# Patient Record
Sex: FEMALE | Race: WHITE | NOT HISPANIC OR LATINO | Employment: FULL TIME | URBAN - METROPOLITAN AREA
[De-identification: names, ages, dates, MRNs, and addresses within clinical notes are randomized per-mention and may not be internally consistent; named-entity substitution may affect disease eponyms.]

---

## 2022-06-27 ENCOUNTER — HOSPITAL ENCOUNTER (EMERGENCY)
Facility: MEDICAL CENTER | Age: 52
End: 2022-06-27
Attending: EMERGENCY MEDICINE
Payer: COMMERCIAL

## 2022-06-27 ENCOUNTER — OFFICE VISIT (OUTPATIENT)
Dept: URGENT CARE | Facility: PHYSICIAN GROUP | Age: 52
End: 2022-06-27
Payer: COMMERCIAL

## 2022-06-27 VITALS
SYSTOLIC BLOOD PRESSURE: 214 MMHG | BODY MASS INDEX: 32.98 KG/M2 | WEIGHT: 168 LBS | HEART RATE: 74 BPM | HEIGHT: 60 IN | RESPIRATION RATE: 16 BRPM | TEMPERATURE: 97.9 F | DIASTOLIC BLOOD PRESSURE: 110 MMHG | OXYGEN SATURATION: 97 %

## 2022-06-27 VITALS
TEMPERATURE: 98.4 F | BODY MASS INDEX: 33.41 KG/M2 | SYSTOLIC BLOOD PRESSURE: 154 MMHG | HEART RATE: 76 BPM | WEIGHT: 170.19 LBS | DIASTOLIC BLOOD PRESSURE: 75 MMHG | HEIGHT: 60 IN | OXYGEN SATURATION: 96 % | RESPIRATION RATE: 16 BRPM

## 2022-06-27 DIAGNOSIS — R10.31 GROIN PAIN, RIGHT: ICD-10-CM

## 2022-06-27 DIAGNOSIS — S76.211A INGUINAL STRAIN, RIGHT, INITIAL ENCOUNTER: ICD-10-CM

## 2022-06-27 DIAGNOSIS — I10 HYPERTENSION, UNSPECIFIED TYPE: ICD-10-CM

## 2022-06-27 DIAGNOSIS — I16.0 HYPERTENSIVE URGENCY: ICD-10-CM

## 2022-06-27 DIAGNOSIS — R03.0 ELEVATED BLOOD PRESSURE READING: ICD-10-CM

## 2022-06-27 LAB
ANION GAP SERPL CALC-SCNC: 13 MMOL/L (ref 7–16)
BASOPHILS # BLD AUTO: 0.6 % (ref 0–1.8)
BASOPHILS # BLD: 0.05 K/UL (ref 0–0.12)
BUN SERPL-MCNC: 18 MG/DL (ref 8–22)
CALCIUM SERPL-MCNC: 9.4 MG/DL (ref 8.5–10.5)
CHLORIDE SERPL-SCNC: 105 MMOL/L (ref 96–112)
CO2 SERPL-SCNC: 21 MMOL/L (ref 20–33)
CREAT SERPL-MCNC: 0.89 MG/DL (ref 0.5–1.4)
EKG IMPRESSION: NORMAL
EOSINOPHIL # BLD AUTO: 0.1 K/UL (ref 0–0.51)
EOSINOPHIL NFR BLD: 1.1 % (ref 0–6.9)
ERYTHROCYTE [DISTWIDTH] IN BLOOD BY AUTOMATED COUNT: 45.8 FL (ref 35.9–50)
GFR SERPLBLD CREATININE-BSD FMLA CKD-EPI: 77 ML/MIN/1.73 M 2
GLUCOSE SERPL-MCNC: 95 MG/DL (ref 65–99)
HCT VFR BLD AUTO: 39.4 % (ref 37–47)
HGB BLD-MCNC: 13.5 G/DL (ref 12–16)
IMM GRANULOCYTES # BLD AUTO: 0.03 K/UL (ref 0–0.11)
IMM GRANULOCYTES NFR BLD AUTO: 0.3 % (ref 0–0.9)
LYMPHOCYTES # BLD AUTO: 1.61 K/UL (ref 1–4.8)
LYMPHOCYTES NFR BLD: 18.3 % (ref 22–41)
MCH RBC QN AUTO: 33.9 PG (ref 27–33)
MCHC RBC AUTO-ENTMCNC: 34.3 G/DL (ref 33.6–35)
MCV RBC AUTO: 99 FL (ref 81.4–97.8)
MONOCYTES # BLD AUTO: 0.69 K/UL (ref 0–0.85)
MONOCYTES NFR BLD AUTO: 7.8 % (ref 0–13.4)
NEUTROPHILS # BLD AUTO: 6.33 K/UL (ref 2–7.15)
NEUTROPHILS NFR BLD: 71.9 % (ref 44–72)
NRBC # BLD AUTO: 0 K/UL
NRBC BLD-RTO: 0 /100 WBC
PLATELET # BLD AUTO: 283 K/UL (ref 164–446)
PMV BLD AUTO: 8.6 FL (ref 9–12.9)
POTASSIUM SERPL-SCNC: 4.5 MMOL/L (ref 3.6–5.5)
RBC # BLD AUTO: 3.98 M/UL (ref 4.2–5.4)
SODIUM SERPL-SCNC: 139 MMOL/L (ref 135–145)
WBC # BLD AUTO: 8.8 K/UL (ref 4.8–10.8)

## 2022-06-27 PROCEDURE — 700102 HCHG RX REV CODE 250 W/ 637 OVERRIDE(OP): Performed by: EMERGENCY MEDICINE

## 2022-06-27 PROCEDURE — 85025 COMPLETE CBC W/AUTO DIFF WBC: CPT

## 2022-06-27 PROCEDURE — 99205 OFFICE O/P NEW HI 60 MIN: CPT | Performed by: PHYSICIAN ASSISTANT

## 2022-06-27 PROCEDURE — 99283 EMERGENCY DEPT VISIT LOW MDM: CPT

## 2022-06-27 PROCEDURE — 93005 ELECTROCARDIOGRAM TRACING: CPT | Performed by: EMERGENCY MEDICINE

## 2022-06-27 PROCEDURE — 80048 BASIC METABOLIC PNL TOTAL CA: CPT

## 2022-06-27 PROCEDURE — 36415 COLL VENOUS BLD VENIPUNCTURE: CPT

## 2022-06-27 PROCEDURE — A9270 NON-COVERED ITEM OR SERVICE: HCPCS | Performed by: EMERGENCY MEDICINE

## 2022-06-27 RX ORDER — LISINOPRIL 10 MG/1
5 TABLET ORAL ONCE
Status: COMPLETED | OUTPATIENT
Start: 2022-06-27 | End: 2022-06-27

## 2022-06-27 RX ORDER — IBUPROFEN 800 MG/1
800 TABLET ORAL EVERY 8 HOURS PRN
Qty: 30 TABLET | Refills: 0 | Status: SHIPPED | OUTPATIENT
Start: 2022-06-27 | End: 2022-12-27 | Stop reason: SDUPTHER

## 2022-06-27 RX ORDER — ESCITALOPRAM OXALATE 10 MG/1
10 TABLET ORAL DAILY
COMMUNITY
End: 2022-12-27 | Stop reason: SDUPTHER

## 2022-06-27 RX ORDER — AMLODIPINE BESYLATE 5 MG/1
5 TABLET ORAL
Status: DISCONTINUED | OUTPATIENT
Start: 2022-06-27 | End: 2022-06-27 | Stop reason: HOSPADM

## 2022-06-27 RX ORDER — AMLODIPINE BESYLATE AND BENAZEPRIL HYDROCHLORIDE 5; 10 MG/1; MG/1
1 CAPSULE ORAL DAILY
Qty: 30 CAPSULE | Refills: 0 | Status: SHIPPED | OUTPATIENT
Start: 2022-06-27 | End: 2022-07-26

## 2022-06-27 RX ADMIN — AMLODIPINE BESYLATE 5 MG: 5 TABLET ORAL at 17:11

## 2022-06-27 RX ADMIN — LISINOPRIL 5 MG: 10 TABLET ORAL at 17:11

## 2022-06-27 NOTE — ED TRIAGE NOTES
.  Chief Complaint   Patient presents with   • Groin Pain     X 3 weeks, patient feels as if she pulled a muscle in her groin. Now she is having difficulty walking.    • Sent by MD     Patient went to  for the groin pain, but was sent here due to HTN. Denies chest pain, denies headache, endorses dizziness.        53 yo female wheeled to triage for above complaint. Patient able to ambulate with limp in triage room. Right groin pain. Patient has not seen a PCP in a while due to insurance issues, but has been hypertensive on her last visits to the ER.      Pt is alert and oriented, speaking in full sentences, follows commands and responds appropriately to questions. NAD.      Patient placed back in lobby and educated on triage process. Asked to inform RN of any changes.    BP (!) 197/122   Pulse 72   Temp 36.8 °C (98.2 °F)   Resp 14   Ht 1.524 m (5')   Wt 77.2 kg (170 lb 3.1 oz)   SpO2 98%   BMI 33.24 kg/m²

## 2022-06-27 NOTE — ED PROVIDER NOTES
ED Provider Note    Scribed for Catracho Rivera M.D. by Joey Crowder. 6/27/2022, 4:40 PM.    Primary care provider: Pcp Pt States None  Means of arrival: Walk in  History obtained from: Patient  History limited by: None    CHIEF COMPLAINT  Chief Complaint   Patient presents with    Groin Pain     X 3 weeks, patient feels as if she pulled a muscle in her groin. Now she is having difficulty walking.     Sent by MD     Patient went to  for the groin pain, but was sent here due to HTN. Denies chest pain, denies headache, endorses dizziness.        HPI  Gabriela Carrillo is a 52 y.o. female who presents to the Emergency Department for evaluation of groin pain onset 3 weeks ago. Patient states she has was picking up something and felt like something strained. Patient states she has been taking Tylenol and Aleve with minimal alleviation to symptoms. She notes she presented to urgent care today for evaluation but was sent to the ER because her blood pressure was over 200. She presented to the ER about 2 weeks ago with regards to bronchitis and had high blood pressure at that time as well (209). Patient reports associated dizziness and shortness of breath, but denies any chest pain, shortness of breath, headache, or vision changes. She states she has been feeling shortness of breath on exertion over the past year. Patient states she takes medications for menopause.       REVIEW OF SYSTEMS  As above otherwise all other systems are negative  C    PAST MEDICAL HISTORY   has a past medical history of Bilateral ovarian cysts, Hypertension, and Murmur, cardiac.    SURGICAL HISTORY  patient denies any surgical history    SOCIAL HISTORY  Social History     Tobacco Use    Smoking status: Never Smoker    Smokeless tobacco: Never Used   Vaping Use    Vaping Use: Never used   Substance Use Topics    Alcohol use: Yes     Alcohol/week: 8.4 oz     Types: 14 Cans of beer per week     Comment: cut down recently to lose weight     Drug use: Not Currently      Social History     Substance and Sexual Activity   Drug Use Not Currently       FAMILY HISTORY  Family History   Problem Relation Age of Onset    Heart Disease Mother     Hypertension Mother     Heart Disease Father     Hypertension Father        CURRENT MEDICATIONS  Home Medications       Reviewed by Margarita Roberts R.N. (Registered Nurse) on 06/27/22 at 1619  Med List Status: Not Addressed     Medication Last Dose Status   escitalopram (LEXAPRO) 10 MG Tab  Active                    ALLERGIES  No Known Allergies    PHYSICAL EXAM  VITAL SIGNS: BP (!) 197/122   Pulse 72   Temp 36.8 °C (98.2 °F)   Resp 14   Ht 1.524 m (5')   Wt 77.2 kg (170 lb 3.1 oz)   SpO2 98%   BMI 33.24 kg/m²     Constitutional: Well developed, Well nourished, No acute distress, Non-toxic appearance.   HENT: Normocephalic, Atraumatic, Bilateral external ears normal, oropharynx moist, No oral exudates, Nose normal.   Eyes:conjunctiva is normal, there are no signs of exudate.   Neck: Supple, no meningeal signs.  Lymphatic: No lymphadenopathy noted.   Cardiovascular: Regular rate and rhythm without murmurs gallops or rubs.   Thorax & Lungs: No respiratory distress. Breathing comfortably. Lungs are clear to auscultation bilaterally, there are no wheezes no rales. Chest wall is nontender.  Abdomen: Soft, nontender, nondistended. Bowel sounds are present.   Skin: Warm, Dry, No erythema,   Back: No tenderness, No CVA tenderness.  Musculoskeletal: Good range of motion in all major joints. No major deformities noted. Intact distal pulses, no clubbing, no cyanosis, no edema. Tenderness to gracilus muscle, tenderness to posterior hamstring insertion into hip.  Neurologic: Alert & oriented x 3, Moving all extremities. No gross abnormalities.    Psychiatric: Affect normal, Judgment normal, Mood normal.     LABS  Results for orders placed or performed during the hospital encounter of 06/27/22   CBC WITH DIFFERENTIAL    Result Value Ref Range    WBC 8.8 4.8 - 10.8 K/uL    RBC 3.98 (L) 4.20 - 5.40 M/uL    Hemoglobin 13.5 12.0 - 16.0 g/dL    Hematocrit 39.4 37.0 - 47.0 %    MCV 99.0 (H) 81.4 - 97.8 fL    MCH 33.9 (H) 27.0 - 33.0 pg    MCHC 34.3 33.6 - 35.0 g/dL    RDW 45.8 35.9 - 50.0 fL    Platelet Count 283 164 - 446 K/uL    MPV 8.6 (L) 9.0 - 12.9 fL    Neutrophils-Polys 71.90 44.00 - 72.00 %    Lymphocytes 18.30 (L) 22.00 - 41.00 %    Monocytes 7.80 0.00 - 13.40 %    Eosinophils 1.10 0.00 - 6.90 %    Basophils 0.60 0.00 - 1.80 %    Immature Granulocytes 0.30 0.00 - 0.90 %    Nucleated RBC 0.00 /100 WBC    Neutrophils (Absolute) 6.33 2.00 - 7.15 K/uL    Lymphs (Absolute) 1.61 1.00 - 4.80 K/uL    Monos (Absolute) 0.69 0.00 - 0.85 K/uL    Eos (Absolute) 0.10 0.00 - 0.51 K/uL    Baso (Absolute) 0.05 0.00 - 0.12 K/uL    Immature Granulocytes (abs) 0.03 0.00 - 0.11 K/uL    NRBC (Absolute) 0.00 K/uL   BMP   Result Value Ref Range    Sodium 139 135 - 145 mmol/L    Potassium 4.5 3.6 - 5.5 mmol/L    Chloride 105 96 - 112 mmol/L    Co2 21 20 - 33 mmol/L    Glucose 95 65 - 99 mg/dL    Bun 18 8 - 22 mg/dL    Creatinine 0.89 0.50 - 1.40 mg/dL    Calcium 9.4 8.5 - 10.5 mg/dL    Anion Gap 13.0 7.0 - 16.0   ESTIMATED GFR   Result Value Ref Range    GFR (CKD-EPI) 77 >60 mL/min/1.73 m 2   EKG   Result Value Ref Range    Report       [facility] Emergency Dept.    Test Date:  2022  Pt Name:    NADIRA ERNANDEZ                Department: ER  MRN:        [id number]                      Room:       [facility]  Gender:     Female                       Technician: [id number]  :        1970                   Requested By:JORGE MCPHERSON  Order #:    [id number]                    Reading MD: JORGE MCPHERSON MD    Measurements  Intervals                                Axis  Rate:       68                           P:          10  AK:         124                          QRS:        19  QRSD:       80                           T:           16  QT:         412  QTc:        439    Interpretive Statements  SINUS RHYTHM  No previous ECG available for comparison  Normal ECG  Electronically Signed On 6- 17:11:20 PDT by JORGE MCPHERSON MD        All labs reviewed by me.    EKG  Interpreted by me    COURSE & MEDICAL DECISION MAKING  Pertinent Labs & Imaging studies reviewed. (See chart for details)    4:40 PM - Patient seen and examined at bedside. She will be treated with amlodipine 5 mg and lisinopril 5 mg. Ordered EKG, CBC with diff, and BMP to evaluate her symptoms.   Instructed patient to follow up with primary care to address her hypertension. Recommended she begin physical therapy at home and follow up with ortho if needed.    Decision Making:  Patient presents for evaluation.  Clinically the patient does have a strain of her gracilis and as well as the hamstring insertion of the right leg.  Secondary cause though she does have hypertension.  Basic metabolic panel shows no signs of renal insufficiency or other significant signs of endorgan injury.  I will start the patient on blood pressure medications and I recommended for the patient to follow-up with her primary care physician for further outpatient evaluation and care and to obtain a blood pressure cuff.  Patient should be taking her blood pressure 2 times a day randomly in the morning and in the evening and then follow-up with her primary care physician with the data.  If she has continued pain in her groin region recommend follow-up with orthopedics for physical therapy referral return as needed.  I will start her with ibuprofen I have given her range of motion and physical therapy instructions here and she is to follow-up as above.    It was noticed that the patient's blood pressure was greater than 120/80 on today's visit. At this point, most likely related to reactive hypertension, secondary to the emergency visit itself. I have recommend the patient followup with her primary care  physician for recheck of her blood pressure.        The patient will return for new or worsening symptoms and is stable at the time of discharge.    The patient is referred to a primary physician for blood pressure management, diabetic screening, and for all other preventative health concerns.    DISPOSITION:  Patient will be discharged home in stable condition.    FOLLOW UP:  [facility] [place]  [address] [address]  [phone]          OUTPATIENT MEDICATIONS:  New Prescriptions    AMLODIPINE-BENAZEPRIL (LOTREL) 5-10 MG PER CAPSULE    Take 1 Capsule by mouth every day.    IBUPROFEN (MOTRIN) 800 MG TAB    Take 1 Tablet by mouth every 8 hours as needed for Moderate Pain.           FINAL IMPRESSION  1. Hypertension, unspecified type    2. Inguinal strain, right, initial encounter          IJoey (Scribe), am scribing for, and in the presence of, Catracho Rivera M.D..    Electronically signed by: Joey Crowder (Scribe), 6/27/2022    ICatracho M.D. personally performed the services described in this documentation, as scribed by Joey Crowder in my presence, and it is both accurate and complete.    The note accurately reflects work and decisions made by me.  Catracho Rivera M.D.  6/27/2022  6:55 PM

## 2022-06-27 NOTE — PROGRESS NOTES
Subjective:   Gabriela Carrillo is a 52 y.o. female who presents for Groin Injury (X 4 days, just started hurting suddenly after bending down)      HPI  Patient is a 52-year-old female who presents to clinic with complaints of right groin pain.  She reports of right groin pain for about 3 weeks, however this was resolving until several days ago she bent over picking up her dog toys and felt immediate pain to her right groin.  She felt it strain and is worse with walking.  Location to the right upper inner thigh/groin.  She went to [facility] this morning and she took her blood pressure and noticed that her blood pressure was 179/114.  Denies any other symptoms.  Denies any headache, dizziness, chest pain, shortness of breath.  She has no other complaints or concerns.          Medications:    • escitalopram Tabs    Allergies: Patient has no known allergies.    Problem List: Gabriela Carrillo does not have a problem list on file.    Surgical History:  No past surgical history on file.    Past Social Hx: Gabriela Carrillo  reports that she has never smoked. She has never used smokeless tobacco. She reports current alcohol use. She reports previous drug use.     Past Family Hx:  Gabriela Carrillo family history includes Heart Disease in her father and mother; Hypertension in her father and mother.     Problem list, medications, and allergies reviewed by myself today in Epic.     Objective:     BP (!) 218/122 (BP Location: Right arm, Patient Position: Sitting, BP Cuff Size: Adult)   Pulse 74   Temp 36.6 °C (97.9 °F) (Temporal)   Resp 16   Ht 1.524 m (5')   Wt 76.2 kg (168 lb)   SpO2 97%   BMI 32.81 kg/m²     Physical Exam  Vitals reviewed.   Constitutional:       General: She is not in acute distress.     Appearance: Normal appearance. She is not ill-appearing or toxic-appearing.   Eyes:      Conjunctiva/sclera: Conjunctivae normal.      Pupils: Pupils are equal, round, and reactive to light.   Cardiovascular:      Rate and Rhythm:  Normal rate and regular rhythm.      Heart sounds: Normal heart sounds.   Pulmonary:      Effort: Pulmonary effort is normal. No respiratory distress.      Breath sounds: Normal breath sounds. No wheezing, rhonchi or rales.   Musculoskeletal:      Cervical back: Neck supple.   Skin:     General: Skin is warm and dry.   Neurological:      General: No focal deficit present.      Mental Status: She is alert and oriented to person, place, and time.   Psychiatric:         Mood and Affect: Mood normal.         Behavior: Behavior normal.         Diagnosis and associated orders:     1. Elevated blood pressure reading    2. Hypertensive urgency    3. Groin pain, right    Other orders  - escitalopram (LEXAPRO) 10 MG Tab; Take 10 mg by mouth every day.       Comments/MDM:     • Patient's initial /122.  After 15 minutes, repeat /110.  Discussed with patient her going pain most likely a strain, however this was not further evaluated as I am concerned about her blood pressure.  Discussed with patient differential diagnosis and concern for possible secondary organ complications due to her elevated blood pressure.  Discussed this needs to be managed emergently.  Therefore, it was highly recommended she present immediately to the ER for higher level of evaluation and care.  Call transfer center and report given awaiting patient arrival.  Patient verbalized understanding and agreed to plan of care.  She had no other questions or concerns.  She is going to present via private vehicle.       New acute problem with uncertain prognosis that poses a threat to bodily function which will require further higher level evaluation and treatment in the emergency room or possible hospitalization.    Please note that this dictation was created using voice recognition software. I have made a reasonable attempt to correct obvious errors, but I expect that there are errors of grammar and possibly content that I did not discover before  finalizing the note.    This note was electronically signed by Umberto Lazaro PA-C

## 2022-06-27 NOTE — Clinical Note
Gabriela Carrillo was seen and treated in our emergency department on 6/27/2022.  She may return to work on 07/04/2022.  Please allow for light duty, no prolonged standing due to an injury for 1 week     If you have any questions or concerns, please don't hesitate to call.      Catracho Rivera M.D.

## 2022-06-28 NOTE — ED NOTES
Patient discharged home per ERP.  Discharge teaching and education discussed with patient. POC discussed.   Patient verbalized understanding of discharge teaching and education. No other questions at this time.     RX x 1 sent to pharmacy by ERP. Work note provided.     VSS. Patient alert and oriented. Patient arranged ride for self. Able to ambulate off unit safely with steady gait.

## 2022-07-26 ENCOUNTER — OFFICE VISIT (OUTPATIENT)
Dept: URGENT CARE | Facility: PHYSICIAN GROUP | Age: 52
End: 2022-07-26
Payer: COMMERCIAL

## 2022-07-26 ENCOUNTER — APPOINTMENT (OUTPATIENT)
Dept: RADIOLOGY | Facility: IMAGING CENTER | Age: 52
End: 2022-07-26
Attending: PHYSICIAN ASSISTANT
Payer: COMMERCIAL

## 2022-07-26 VITALS
RESPIRATION RATE: 20 BRPM | TEMPERATURE: 98 F | HEART RATE: 67 BPM | HEIGHT: 60 IN | BODY MASS INDEX: 31.41 KG/M2 | OXYGEN SATURATION: 94 % | DIASTOLIC BLOOD PRESSURE: 76 MMHG | WEIGHT: 160 LBS | SYSTOLIC BLOOD PRESSURE: 142 MMHG

## 2022-07-26 DIAGNOSIS — R05.2 SUBACUTE COUGH: ICD-10-CM

## 2022-07-26 DIAGNOSIS — R05.9 COUGH: ICD-10-CM

## 2022-07-26 DIAGNOSIS — U07.1 COVID-19: ICD-10-CM

## 2022-07-26 DIAGNOSIS — I10 HYPERTENSION, UNSPECIFIED TYPE: ICD-10-CM

## 2022-07-26 DIAGNOSIS — B34.9 NONSPECIFIC SYNDROME SUGGESTIVE OF VIRAL ILLNESS: ICD-10-CM

## 2022-07-26 LAB
EXTERNAL QUALITY CONTROL: ABNORMAL
SARS-COV+SARS-COV-2 AG RESP QL IA.RAPID: POSITIVE

## 2022-07-26 PROCEDURE — 99214 OFFICE O/P EST MOD 30 MIN: CPT | Mod: CS | Performed by: PHYSICIAN ASSISTANT

## 2022-07-26 PROCEDURE — 71046 X-RAY EXAM CHEST 2 VIEWS: CPT | Mod: TC | Performed by: PHYSICIAN ASSISTANT

## 2022-07-26 PROCEDURE — 87426 SARSCOV CORONAVIRUS AG IA: CPT | Performed by: PHYSICIAN ASSISTANT

## 2022-07-26 RX ORDER — AMLODIPINE BESYLATE AND BENAZEPRIL HYDROCHLORIDE 5; 10 MG/1; MG/1
1 CAPSULE ORAL DAILY
Qty: 14 CAPSULE | Refills: 0 | Status: SHIPPED | OUTPATIENT
Start: 2022-07-26 | End: 2022-08-12 | Stop reason: SDUPTHER

## 2022-07-26 RX ORDER — DEXTROMETHORPHAN HYDROBROMIDE AND PROMETHAZINE HYDROCHLORIDE 15; 6.25 MG/5ML; MG/5ML
5 SYRUP ORAL EVERY 4 HOURS PRN
Qty: 120 ML | Refills: 0 | Status: SHIPPED | OUTPATIENT
Start: 2022-07-26 | End: 2022-08-26

## 2022-07-26 RX ORDER — BENZONATATE 200 MG/1
200 CAPSULE ORAL 3 TIMES DAILY PRN
Qty: 60 CAPSULE | Refills: 0 | Status: SHIPPED | OUTPATIENT
Start: 2022-07-26 | End: 2022-08-26

## 2022-07-26 ASSESSMENT — ENCOUNTER SYMPTOMS
SORE THROAT: 0
COUGH: 1
FEVER: 0
CHILLS: 0
SHORTNESS OF BREATH: 0
NAUSEA: 0
VOMITING: 0
SPUTUM PRODUCTION: 1

## 2022-07-26 NOTE — PROGRESS NOTES
Subjective:   Gabriela Carrillo is a 52 y.o. female who presents for Cough (Headache,x1 month/Refill amlodipine-benazepril)        1.  Patient presents with concerns of elevated blood pressure that has been present for the last several months.  She was previously evaluated for this in the ED and started on amlodipine and benazepril.  This has been working well to control her blood pressure but she states that she developed a dry cough almost immediately after starting this medication.  Additionally she also gets occasional headaches from it.  She has a follow-up appointment with her new PCP on 8/3/2022.  No chest pain, lightheadedness, dizziness, syncopal episodes.      2. patient states that she has been experiencing worsening cough, malaise, fatigue for the last 8 days or so.  She had a subjective fever last night and coughed up a bit of blood-tinged mucus this morning.  No chest pain, shortness of breath, difficulty breathing.  She does endorse body aches.  She is not taking any medications for symptoms.  Her  tested positive for COVID-19 last week, but she states that she has not seen him for couple weeks as he is on the road most of the time.  Testing for COVID-19 last week was negative.      Review of Systems   Constitutional: Negative for chills and fever.   HENT: Positive for congestion. Negative for sore throat.    Respiratory: Positive for cough and sputum production. Negative for shortness of breath.    Cardiovascular: Negative for chest pain.   Gastrointestinal: Negative for nausea and vomiting.       PMH:  has a past medical history of Bilateral ovarian cysts, Hypertension, and Murmur, cardiac.  MEDS:   Current Outpatient Medications:   •  amlodipine-benazepril (LOTREL) 5-10 MG per capsule, Take 1 Capsule by mouth every day., Disp: 14 Capsule, Rfl: 0  •  promethazine-dextromethorphan (PROMETHAZINE-DM) 6.25-15 MG/5ML syrup, Take 5 mL by mouth every four hours as needed for Cough., Disp: 120  mL, Rfl: 0  •  benzonatate (TESSALON) 200 MG capsule, Take 1 Capsule by mouth 3 times a day as needed for Cough., Disp: 60 Capsule, Rfl: 0  •  escitalopram (LEXAPRO) 10 MG Tab, Take 10 mg by mouth every day., Disp: , Rfl:   •  ibuprofen (MOTRIN) 800 MG Tab, Take 1 Tablet by mouth every 8 hours as needed for Moderate Pain., Disp: 30 Tablet, Rfl: 0  •  ibuprofen (MOTRIN) 800 MG Tab, Take 1 Tablet by mouth 2 times a day with meals for 30 days. (Patient not taking: Reported on 7/26/2022), Disp: 60 Tablet, Rfl: 3  ALLERGIES: No Known Allergies  SURGHX: No past surgical history on file.  SOCHX:  reports that she has never smoked. She has never used smokeless tobacco. She reports current alcohol use of about 8.4 oz of alcohol per week. She reports previous drug use.  FH: Family history was reviewed, no pertinent findings to report   Objective:   BP (!) 142/76 (BP Location: Left arm, Patient Position: Sitting, BP Cuff Size: Adult)   Pulse 67   Temp 36.7 °C (98 °F) (Temporal)   Resp 20   Ht 1.524 m (5')   Wt 72.6 kg (160 lb)   SpO2 94%   BMI 31.25 kg/m²   Physical Exam  Vitals reviewed.   Constitutional:       General: She is not in acute distress.     Appearance: Normal appearance. She is well-developed. She is not toxic-appearing.   HENT:      Head: Normocephalic and atraumatic.      Right Ear: External ear normal.      Left Ear: External ear normal.      Nose: Congestion present.   Cardiovascular:      Rate and Rhythm: Normal rate and regular rhythm.      Heart sounds: Normal heart sounds, S1 normal and S2 normal.   Pulmonary:      Effort: Pulmonary effort is normal. No respiratory distress.      Breath sounds: Normal breath sounds. No stridor. No decreased breath sounds, wheezing, rhonchi or rales.      Comments: Occasional harsh cough.  Skin:     General: Skin is dry.   Neurological:      Comments: Alert and oriented.    Psychiatric:         Speech: Speech normal.         Behavior: Behavior normal.              Imaging:  COMPARISON:  None.     FINDINGS:  The heart is normal in size.  No pulmonary infiltrates or consolidations are noted.  No pleural effusions are appreciated.  No visible pneumothorax.     IMPRESSION:     No radiographic evidence of acute cardiopulmonary disease.  Assessment/Plan:   1. COVID-19  - promethazine-dextromethorphan (PROMETHAZINE-DM) 6.25-15 MG/5ML syrup; Take 5 mL by mouth every four hours as needed for Cough.  Dispense: 120 mL; Refill: 0  - benzonatate (TESSALON) 200 MG capsule; Take 1 Capsule by mouth 3 times a day as needed for Cough.  Dispense: 60 Capsule; Refill: 0    2. Nonspecific syndrome suggestive of viral illness  - promethazine-dextromethorphan (PROMETHAZINE-DM) 6.25-15 MG/5ML syrup; Take 5 mL by mouth every four hours as needed for Cough.  Dispense: 120 mL; Refill: 0  - benzonatate (TESSALON) 200 MG capsule; Take 1 Capsule by mouth 3 times a day as needed for Cough.  Dispense: 60 Capsule; Refill: 0    3. Subacute cough  - DX-CHEST-2 VIEWS; Future  - POCT SARS-COV Antigen CLARENCE (Symptomatic only)    4. Hypertension, unspecified type  - amlodipine-benazepril (LOTREL) 5-10 MG per capsule; Take 1 Capsule by mouth every day.  Dispense: 14 Capsule; Refill: 0    1.  Patient positive for COVID-19.  Urgently there is no evidence of COVID-19 pneumonia or other acute cardiopulmonary abnormality on imaging.  It is unclear when symptoms started exactly but I suspect it is well over a week ago.  As such she is not a candidate for antiviral therapy.  Patient has a good understanding of etiology and disease course.  Quarantine in accordance with CDC guidelines.  Rest, hydrate, antitussives as needed.  She may also try nasal saline rinses and Flonase.  Recommend reevaluation with any new or worsening symptoms.    If she develops chest pain, shortness of breath, difficulty breathing, pain with breathing recommend that she go to the emergency room for reevaluation and further management.    2.   Chronic problem.  Has upcoming appointment with new PCP next week.  I suspect that subacute dry cough is related to the ACE inhibitor.  Patient will likely require treatment modification, but I will leave that to her PCP to address.  We will refill her medication in the interim and have her follow-up with PCP as scheduled.    Differential diagnosis, natural history, supportive care, and indications for immediate follow-up discussed.

## 2022-07-26 NOTE — LETTER
July 26, 2022    To Whom It May Concern:         This is confirmation that Gabriela Carrillo attended her scheduled appointment with Shivam Mullen P.A.-C. on 7/26/22.  She was instructed to quarantine in accordance with CDC guidelines.    If you had COVID-19 and had symptoms, isolate for at least 5 days. To calculate your 5-day isolation period, day 0 is your first day of symptoms. Day 1 is the first full day after your symptoms developed. You can leave isolation after 5 full days.    • You can end isolation after 5 full days if you are fever-free for 24 hours without the use of fever-reducing medication and your other symptoms have improved (Loss of taste and smell may persist for weeks or months after recovery and need not delay the end of isolation).     • You should continue to wear a well-fitting mask around others at home and in public for 5 additional days (day 6 through day 10) after the end of your 5-day isolation period. If you are unable to wear a mask when around others, you should continue to isolate for a full 10 days. Avoid people who are immunocompromised or at high risk for severe disease, and nursing homes and other high-risk settings, until after at least 10 days.     • If you continue to have fever or your other symptoms have not improved after 5 days of isolation, you should wait to end your isolation until you are fever-free for 24 hours without the use of fever-reducing medication and your other symptoms have improved. Continue to wear a well-fitting mask. Contact your healthcare provider if you have questions    • Do not travel during your 5-day isolation period. After you end isolation, avoid travel until a full 10 days after your first day of symptoms. If you must travel on days 6-10, wear a well-fitting mask when you are around others for the entire duration of travel. If you are unable to wear a mask, you should not travel during the 10 days.     • Do not go to places where you are  unable to wear a mask, such as restaurants and some gyms, and avoid eating around others at home and at work until a full 10 days after your first day of symptoms.         If you have any questions please do not hesitate to call me at the phone number listed below.    Sincerely,    LUDIN HamiltonC.  [phone]

## 2022-08-01 SDOH — HEALTH STABILITY: PHYSICAL HEALTH: ON AVERAGE, HOW MANY MINUTES DO YOU ENGAGE IN EXERCISE AT THIS LEVEL?: 10 MIN

## 2022-08-01 SDOH — ECONOMIC STABILITY: INCOME INSECURITY: IN THE LAST 12 MONTHS, WAS THERE A TIME WHEN YOU WERE NOT ABLE TO PAY THE MORTGAGE OR RENT ON TIME?: NO

## 2022-08-01 SDOH — ECONOMIC STABILITY: HOUSING INSECURITY
IN THE LAST 12 MONTHS, WAS THERE A TIME WHEN YOU DID NOT HAVE A STEADY PLACE TO SLEEP OR SLEPT IN A SHELTER (INCLUDING NOW)?: NO

## 2022-08-01 SDOH — ECONOMIC STABILITY: TRANSPORTATION INSECURITY
IN THE PAST 12 MONTHS, HAS THE LACK OF TRANSPORTATION KEPT YOU FROM MEDICAL APPOINTMENTS OR FROM GETTING MEDICATIONS?: NO

## 2022-08-01 SDOH — ECONOMIC STABILITY: FOOD INSECURITY: WITHIN THE PAST 12 MONTHS, THE FOOD YOU BOUGHT JUST DIDN'T LAST AND YOU DIDN'T HAVE MONEY TO GET MORE.: NEVER TRUE

## 2022-08-01 SDOH — ECONOMIC STABILITY: FOOD INSECURITY: WITHIN THE PAST 12 MONTHS, YOU WORRIED THAT YOUR FOOD WOULD RUN OUT BEFORE YOU GOT MONEY TO BUY MORE.: NEVER TRUE

## 2022-08-01 SDOH — ECONOMIC STABILITY: HOUSING INSECURITY: IN THE LAST 12 MONTHS, HOW MANY PLACES HAVE YOU LIVED?: 1

## 2022-08-01 SDOH — HEALTH STABILITY: MENTAL HEALTH
STRESS IS WHEN SOMEONE FEELS TENSE, NERVOUS, ANXIOUS, OR CAN'T SLEEP AT NIGHT BECAUSE THEIR MIND IS TROUBLED. HOW STRESSED ARE YOU?: ONLY A LITTLE

## 2022-08-01 SDOH — HEALTH STABILITY: PHYSICAL HEALTH: ON AVERAGE, HOW MANY DAYS PER WEEK DO YOU ENGAGE IN MODERATE TO STRENUOUS EXERCISE (LIKE A BRISK WALK)?: 0 DAYS

## 2022-08-01 SDOH — ECONOMIC STABILITY: INCOME INSECURITY: HOW HARD IS IT FOR YOU TO PAY FOR THE VERY BASICS LIKE FOOD, HOUSING, MEDICAL CARE, AND HEATING?: NOT HARD AT ALL

## 2022-08-01 SDOH — ECONOMIC STABILITY: TRANSPORTATION INSECURITY
IN THE PAST 12 MONTHS, HAS LACK OF TRANSPORTATION KEPT YOU FROM MEETINGS, WORK, OR FROM GETTING THINGS NEEDED FOR DAILY LIVING?: NO

## 2022-08-01 SDOH — ECONOMIC STABILITY: TRANSPORTATION INSECURITY
IN THE PAST 12 MONTHS, HAS LACK OF RELIABLE TRANSPORTATION KEPT YOU FROM MEDICAL APPOINTMENTS, MEETINGS, WORK OR FROM GETTING THINGS NEEDED FOR DAILY LIVING?: NO

## 2022-08-01 ASSESSMENT — SOCIAL DETERMINANTS OF HEALTH (SDOH)
HOW MANY DRINKS CONTAINING ALCOHOL DO YOU HAVE ON A TYPICAL DAY WHEN YOU ARE DRINKING: 1 OR 2
HOW OFTEN DO YOU GET TOGETHER WITH FRIENDS OR RELATIVES?: ONCE A WEEK
DO YOU BELONG TO ANY CLUBS OR ORGANIZATIONS SUCH AS CHURCH GROUPS UNIONS, FRATERNAL OR ATHLETIC GROUPS, OR SCHOOL GROUPS?: NO
HOW OFTEN DO YOU ATTENT MEETINGS OF THE CLUB OR ORGANIZATION YOU BELONG TO?: NEVER
HOW HARD IS IT FOR YOU TO PAY FOR THE VERY BASICS LIKE FOOD, HOUSING, MEDICAL CARE, AND HEATING?: NOT HARD AT ALL
HOW OFTEN DO YOU HAVE SIX OR MORE DRINKS ON ONE OCCASION: LESS THAN MONTHLY
IN A TYPICAL WEEK, HOW MANY TIMES DO YOU TALK ON THE PHONE WITH FAMILY, FRIENDS, OR NEIGHBORS?: TWICE A WEEK
IN A TYPICAL WEEK, HOW MANY TIMES DO YOU TALK ON THE PHONE WITH FAMILY, FRIENDS, OR NEIGHBORS?: TWICE A WEEK
HOW OFTEN DO YOU GET TOGETHER WITH FRIENDS OR RELATIVES?: ONCE A WEEK
HOW OFTEN DO YOU GET TOGETHER WITH FRIENDS OR RELATIVES?: ONCE A WEEK
HOW OFTEN DO YOU ATTEND CHURCH OR RELIGIOUS SERVICES?: NEVER
HOW OFTEN DO YOU ATTEND CHURCH OR RELIGIOUS SERVICES?: NEVER
HOW OFTEN DO YOU ATTENT MEETINGS OF THE CLUB OR ORGANIZATION YOU BELONG TO?: NEVER
DO YOU BELONG TO ANY CLUBS OR ORGANIZATIONS SUCH AS CHURCH GROUPS UNIONS, FRATERNAL OR ATHLETIC GROUPS, OR SCHOOL GROUPS?: NO
HOW OFTEN DO YOU ATTENT MEETINGS OF THE CLUB OR ORGANIZATION YOU BELONG TO?: NEVER
HOW OFTEN DO YOU HAVE A DRINK CONTAINING ALCOHOL: MONTHLY OR LESS
DO YOU BELONG TO ANY CLUBS OR ORGANIZATIONS SUCH AS CHURCH GROUPS UNIONS, FRATERNAL OR ATHLETIC GROUPS, OR SCHOOL GROUPS?: NO
IN A TYPICAL WEEK, HOW MANY TIMES DO YOU TALK ON THE PHONE WITH FAMILY, FRIENDS, OR NEIGHBORS?: TWICE A WEEK
HOW OFTEN DO YOU ATTEND CHURCH OR RELIGIOUS SERVICES?: NEVER
WITHIN THE PAST 12 MONTHS, YOU WORRIED THAT YOUR FOOD WOULD RUN OUT BEFORE YOU GOT THE MONEY TO BUY MORE: NEVER TRUE

## 2022-08-01 ASSESSMENT — LIFESTYLE VARIABLES
HOW OFTEN DO YOU HAVE SIX OR MORE DRINKS ON ONE OCCASION: LESS THAN MONTHLY
AUDIT-C TOTAL SCORE: 2
SKIP TO QUESTIONS 9-10: 0
HOW MANY STANDARD DRINKS CONTAINING ALCOHOL DO YOU HAVE ON A TYPICAL DAY: 1 OR 2
AUDIT-C TOTAL SCORE: 2
HOW OFTEN DO YOU HAVE A DRINK CONTAINING ALCOHOL: MONTHLY OR LESS
SKIP TO QUESTIONS 9-10: 0
HOW MANY STANDARD DRINKS CONTAINING ALCOHOL DO YOU HAVE ON A TYPICAL DAY: 1 OR 2
HOW OFTEN DO YOU HAVE SIX OR MORE DRINKS ON ONE OCCASION: LESS THAN MONTHLY
HOW OFTEN DO YOU HAVE A DRINK CONTAINING ALCOHOL: MONTHLY OR LESS

## 2022-08-03 ENCOUNTER — APPOINTMENT (OUTPATIENT)
Dept: MEDICAL GROUP | Facility: PHYSICIAN GROUP | Age: 52
End: 2022-08-03
Attending: EMERGENCY MEDICINE
Payer: COMMERCIAL

## 2022-08-12 DIAGNOSIS — I10 HYPERTENSION, UNSPECIFIED TYPE: ICD-10-CM

## 2022-08-12 RX ORDER — AMLODIPINE BESYLATE AND BENAZEPRIL HYDROCHLORIDE 5; 10 MG/1; MG/1
1 CAPSULE ORAL DAILY
Qty: 14 CAPSULE | Refills: 0 | Status: SHIPPED | OUTPATIENT
Start: 2022-08-12 | End: 2022-08-26

## 2022-08-12 NOTE — TELEPHONE ENCOUNTER
VOICEMAIL  1. Caller Name: Gabriela Carrillo                    Call Back Number: [phone] (home)     2. Message: Pt called, she is set to establish on 08/26/22 with Dhaval Donovan, does not have PCP at this time, and will be out of her HTN medication as of tomorrow. Pt is asking if Dhaval Donovan would be willing to send in a refill to allow her to continue this medication until she is established with her.  Please advise.  Pt has asked for a cb.

## 2022-08-25 SDOH — HEALTH STABILITY: PHYSICAL HEALTH: ON AVERAGE, HOW MANY MINUTES DO YOU ENGAGE IN EXERCISE AT THIS LEVEL?: 10 MIN

## 2022-08-25 SDOH — ECONOMIC STABILITY: FOOD INSECURITY: WITHIN THE PAST 12 MONTHS, YOU WORRIED THAT YOUR FOOD WOULD RUN OUT BEFORE YOU GOT MONEY TO BUY MORE.: NEVER TRUE

## 2022-08-25 SDOH — ECONOMIC STABILITY: FOOD INSECURITY: WITHIN THE PAST 12 MONTHS, THE FOOD YOU BOUGHT JUST DIDN'T LAST AND YOU DIDN'T HAVE MONEY TO GET MORE.: NEVER TRUE

## 2022-08-25 SDOH — ECONOMIC STABILITY: INCOME INSECURITY: HOW HARD IS IT FOR YOU TO PAY FOR THE VERY BASICS LIKE FOOD, HOUSING, MEDICAL CARE, AND HEATING?: NOT HARD AT ALL

## 2022-08-25 SDOH — ECONOMIC STABILITY: INCOME INSECURITY: IN THE LAST 12 MONTHS, WAS THERE A TIME WHEN YOU WERE NOT ABLE TO PAY THE MORTGAGE OR RENT ON TIME?: NO

## 2022-08-25 SDOH — HEALTH STABILITY: PHYSICAL HEALTH: ON AVERAGE, HOW MANY DAYS PER WEEK DO YOU ENGAGE IN MODERATE TO STRENUOUS EXERCISE (LIKE A BRISK WALK)?: 0 DAYS

## 2022-08-25 SDOH — ECONOMIC STABILITY: HOUSING INSECURITY: IN THE LAST 12 MONTHS, HOW MANY PLACES HAVE YOU LIVED?: 1

## 2022-08-25 ASSESSMENT — SOCIAL DETERMINANTS OF HEALTH (SDOH)
HOW OFTEN DO YOU GET TOGETHER WITH FRIENDS OR RELATIVES?: ONCE A WEEK
HOW MANY DRINKS CONTAINING ALCOHOL DO YOU HAVE ON A TYPICAL DAY WHEN YOU ARE DRINKING: 1 OR 2
IN A TYPICAL WEEK, HOW MANY TIMES DO YOU TALK ON THE PHONE WITH FAMILY, FRIENDS, OR NEIGHBORS?: TWICE A WEEK
DO YOU BELONG TO ANY CLUBS OR ORGANIZATIONS SUCH AS CHURCH GROUPS UNIONS, FRATERNAL OR ATHLETIC GROUPS, OR SCHOOL GROUPS?: NO
HOW OFTEN DO YOU ATTEND CHURCH OR RELIGIOUS SERVICES?: NEVER
HOW OFTEN DO YOU ATTEND CHURCH OR RELIGIOUS SERVICES?: NEVER
HOW HARD IS IT FOR YOU TO PAY FOR THE VERY BASICS LIKE FOOD, HOUSING, MEDICAL CARE, AND HEATING?: NOT HARD AT ALL
HOW OFTEN DO YOU HAVE A DRINK CONTAINING ALCOHOL: MONTHLY OR LESS
HOW OFTEN DO YOU HAVE SIX OR MORE DRINKS ON ONE OCCASION: LESS THAN MONTHLY
HOW OFTEN DO YOU GET TOGETHER WITH FRIENDS OR RELATIVES?: ONCE A WEEK
IN A TYPICAL WEEK, HOW MANY TIMES DO YOU TALK ON THE PHONE WITH FAMILY, FRIENDS, OR NEIGHBORS?: TWICE A WEEK
DO YOU BELONG TO ANY CLUBS OR ORGANIZATIONS SUCH AS CHURCH GROUPS UNIONS, FRATERNAL OR ATHLETIC GROUPS, OR SCHOOL GROUPS?: NO
HOW OFTEN DO YOU ATTENT MEETINGS OF THE CLUB OR ORGANIZATION YOU BELONG TO?: NEVER
WITHIN THE PAST 12 MONTHS, YOU WORRIED THAT YOUR FOOD WOULD RUN OUT BEFORE YOU GOT THE MONEY TO BUY MORE: NEVER TRUE
HOW OFTEN DO YOU ATTENT MEETINGS OF THE CLUB OR ORGANIZATION YOU BELONG TO?: NEVER

## 2022-08-25 ASSESSMENT — LIFESTYLE VARIABLES
AUDIT-C TOTAL SCORE: 2
HOW MANY STANDARD DRINKS CONTAINING ALCOHOL DO YOU HAVE ON A TYPICAL DAY: 1 OR 2
SKIP TO QUESTIONS 9-10: 0
HOW OFTEN DO YOU HAVE SIX OR MORE DRINKS ON ONE OCCASION: LESS THAN MONTHLY
HOW OFTEN DO YOU HAVE A DRINK CONTAINING ALCOHOL: MONTHLY OR LESS

## 2022-08-26 ENCOUNTER — OFFICE VISIT (OUTPATIENT)
Dept: MEDICAL GROUP | Facility: PHYSICIAN GROUP | Age: 52
End: 2022-08-26
Attending: EMERGENCY MEDICINE
Payer: COMMERCIAL

## 2022-08-26 VITALS
HEIGHT: 60 IN | HEART RATE: 78 BPM | WEIGHT: 160.2 LBS | OXYGEN SATURATION: 96 % | TEMPERATURE: 96.2 F | BODY MASS INDEX: 31.45 KG/M2 | SYSTOLIC BLOOD PRESSURE: 136 MMHG | DIASTOLIC BLOOD PRESSURE: 90 MMHG

## 2022-08-26 DIAGNOSIS — Z12.31 ENCOUNTER FOR SCREENING MAMMOGRAM FOR MALIGNANT NEOPLASM OF BREAST: ICD-10-CM

## 2022-08-26 DIAGNOSIS — D75.89 MACROCYTOSIS: ICD-10-CM

## 2022-08-26 DIAGNOSIS — E66.9 OBESITY (BMI 30-39.9): ICD-10-CM

## 2022-08-26 DIAGNOSIS — Z12.11 SCREENING FOR COLON CANCER: ICD-10-CM

## 2022-08-26 DIAGNOSIS — I10 PRIMARY HYPERTENSION: ICD-10-CM

## 2022-08-26 PROCEDURE — 99214 OFFICE O/P EST MOD 30 MIN: CPT

## 2022-08-26 RX ORDER — OLMESARTAN MEDOXOMIL 20 MG/1
20 TABLET ORAL DAILY
Qty: 30 TABLET | Refills: 3 | Status: SHIPPED | OUTPATIENT
Start: 2022-08-26 | End: 2022-09-26 | Stop reason: SDUPTHER

## 2022-08-26 ASSESSMENT — ENCOUNTER SYMPTOMS
COUGH: 1
HEMOPTYSIS: 0
SHORTNESS OF BREATH: 0
WHEEZING: 0
SPUTUM PRODUCTION: 0

## 2022-08-26 ASSESSMENT — PATIENT HEALTH QUESTIONNAIRE - PHQ9: CLINICAL INTERPRETATION OF PHQ2 SCORE: 0

## 2022-08-26 NOTE — PROGRESS NOTES
Subjective:     CC:  Diagnoses of Primary hypertension, Macrocytosis, Encounter for screening mammogram for malignant neoplasm of breast, Screening for colon cancer, and Obesity (BMI 30-39.9) were pertinent to this visit.    HISTORY OF THE PRESENT ILLNESS: Patient is a 52 y.o. female. This pleasant patient is here today to establish care and discuss the following problems:    Problem   Primary Hypertension    This is a subacute condition that patient became aware of in June 2022 at an emergency room visit for groin pain.  Blood pressure was found to be elevated.  At the time of this visit patient was placed on Lotrel 5-10 mg caplets daily.  She has been taking this medication but has been experiencing a slight dry cough since starting this.  It is uncertain whether the medication is causing this as she did get COVID about a month ago and had a cough.  However, the fact that it is been over a month and she is still coughing is concerning.  Blood pressure today in clinic slightly elevated 136/90.  She is amendable to trying first-line agent such as an ARB that will not cause cough.     Macrocytosis    New condition of uncertain prognosis  - Last CBC done June 27, 2022 showed elevated MCV and low red blood cells.  - Patient unaware of this diagnosis and does not report a history of anemia.  However, she had increased alcohol at that point time and that could have contributed to this condition.  - We will recheck CBC, B12/folate, at next lab draw.     Obesity (Bmi 30-39.9)    Chronic condition active for which patient is actively changing lifestyle habits to correct.  - Unfortunately she is experiencing right groin pain at this time for which she has been worked up for from orthopedics and is unable to partake in a formalized exercise routine.         Health Maintenance: Completed colonoscopy and mammogram ordered, patient will forego vaccines at this time    ROS:   Review of Systems   Respiratory:  Positive for cough.  Negative for hemoptysis, sputum production, shortness of breath and wheezing.    Cardiovascular:  Negative for chest pain.   Musculoskeletal:  Positive for joint pain.       Objective:     Exam: BP (!) 136/90 (BP Location: Left arm, Patient Position: Sitting, BP Cuff Size: Adult)   Pulse 78   Temp (!) 35.7 °C (96.2 °F) (Temporal)   Ht 1.524 m (5')   Wt 72.7 kg (160 lb 3.2 oz)   SpO2 96%  Body mass index is 31.29 kg/m².    Physical Exam  Constitutional:       General: She is not in acute distress.     Appearance: Normal appearance. She is not ill-appearing or toxic-appearing.   HENT:      Head: Normocephalic.   Eyes:      Conjunctiva/sclera: Conjunctivae normal.   Cardiovascular:      Rate and Rhythm: Normal rate and regular rhythm.      Pulses: Normal pulses.      Heart sounds: Normal heart sounds. No murmur heard.  Pulmonary:      Effort: Pulmonary effort is normal. No respiratory distress.      Breath sounds: Normal breath sounds.   Skin:     General: Skin is warm and dry.   Neurological:      General: No focal deficit present.      Mental Status: She is alert and oriented to person, place, and time.   Psychiatric:         Mood and Affect: Mood normal.         Behavior: Behavior normal.         Labs: CBC done 6/27/2022 shows macrocytosis with low red blood cells otherwise normal, CMP within normal limits    Assessment & Plan: Medical Decision Making   52 y.o. female with the following -    Problem List Items Addressed This Visit       Primary hypertension     Subacute condition unstable  -Discontinue Lotrel  - Olmesartan 20 mg daily prescribed.  Risk and benefits of this medication discussed including adverse reactions  - Patient has been advised to take blood pressure readings at home both a.m. and p.m. and bring readings to the next visit.  Instructions and blood pressure log given.           Relevant Medications    olmesartan (BENICAR) 20 MG Tab    Other Relevant Orders    Lipid Profile    TSH WITH REFLEX  TO FT4    VITAMIN D,25 HYDROXY (DEFICIENCY)    HEMOGLOBIN A1C    Macrocytosis     New condition of uncertain prognosis  - Repeat CBC, B12, folate at next lab draw  - Recommend decreasing alcohol intake to no more than 1 standard drink daily for a female           Relevant Orders    VITAMIN B12    FOLATE    CBC WITHOUT DIFFERENTIAL    Obesity (BMI 30-39.9)     Chronic condition active  - Encouraged healthy diet of at least 4 vegetables and 2 fruits daily, lean meats, avoidance of processed foods and sugars  - Recommend drinking at least 64 ounces of water daily  - Labs as follows         Relevant Orders    Lipid Profile    TSH WITH REFLEX TO FT4    VITAMIN D,25 HYDROXY (DEFICIENCY)    HEMOGLOBIN A1C    Patient identified as having weight management issue.  Appropriate orders and counseling given.     Other Visit Diagnoses       Encounter for screening mammogram for malignant neoplasm of breast        Relevant Orders    MA-SCREENING MAMMO BILAT W/TOMOSYNTHESIS W/CAD    Screening for colon cancer        Relevant Orders    Referral to GI for Colonoscopy            Differential diagnosis, natural history, supportive care, and indications for immediate follow-up discussed.  Shared decision making approach was utilized, and patient is amendable with plan of care.  Patient understands to return to clinic or go to the emergency department if symptoms worsen. All questions and concerns addressed.      Return in about 4 weeks (around 9/23/2022) for F/U Lab Review, F/U BP.    Please note that this dictation was created using voice recognition software. I have made every reasonable attempt to correct obvious errors, but I expect that there are errors of grammar and possibly content that I did not discover before finalizing the note.

## 2022-08-26 NOTE — ASSESSMENT & PLAN NOTE
Chronic condition active  - Encouraged healthy diet of at least 4 vegetables and 2 fruits daily, lean meats, avoidance of processed foods and sugars  - Recommend drinking at least 64 ounces of water daily  - Labs as follows

## 2022-08-26 NOTE — ASSESSMENT & PLAN NOTE
Subacute condition unstable  -Discontinue Lotrel  - Olmesartan 20 mg daily prescribed.  Risk and benefits of this medication discussed including adverse reactions  - Patient has been advised to take blood pressure readings at home both a.m. and p.m. and bring readings to the next visit.  Instructions and blood pressure log given.

## 2022-08-26 NOTE — ASSESSMENT & PLAN NOTE
New condition of uncertain prognosis  - Repeat CBC, B12, folate at next lab draw  - Recommend decreasing alcohol intake to no more than 1 standard drink daily for a female

## 2022-09-10 ENCOUNTER — APPOINTMENT (OUTPATIENT)
Dept: RADIOLOGY | Facility: MEDICAL CENTER | Age: 52
End: 2022-09-10
Attending: PHYSICIAN ASSISTANT
Payer: COMMERCIAL

## 2022-09-13 ENCOUNTER — APPOINTMENT (OUTPATIENT)
Dept: RADIOLOGY | Facility: MEDICAL CENTER | Age: 52
End: 2022-09-13
Attending: PHYSICIAN ASSISTANT
Payer: COMMERCIAL

## 2022-09-13 DIAGNOSIS — R10.31 GROIN PAIN, RIGHT: ICD-10-CM

## 2022-09-13 DIAGNOSIS — S73.191A TEAR OF RIGHT ACETABULAR LABRUM, INITIAL ENCOUNTER: ICD-10-CM

## 2022-09-13 PROCEDURE — 73721 MRI JNT OF LWR EXTRE W/O DYE: CPT | Mod: RT

## 2022-09-26 DIAGNOSIS — I10 PRIMARY HYPERTENSION: ICD-10-CM

## 2022-09-26 DIAGNOSIS — R10.31 GROIN PAIN, RIGHT: ICD-10-CM

## 2022-09-26 RX ORDER — OLMESARTAN MEDOXOMIL 20 MG/1
20 TABLET ORAL DAILY
Qty: 90 TABLET | Refills: 3 | Status: SHIPPED | OUTPATIENT
Start: 2022-09-26 | End: 2022-12-27 | Stop reason: SDUPTHER

## 2022-09-26 RX ORDER — NAPROXEN 500 MG/1
500 TABLET ORAL 2 TIMES DAILY WITH MEALS
Qty: 60 TABLET | Refills: 0 | Status: SHIPPED | OUTPATIENT
Start: 2022-09-26 | End: 2022-09-29

## 2022-12-24 ENCOUNTER — HOSPITAL ENCOUNTER (OUTPATIENT)
Dept: LAB | Facility: MEDICAL CENTER | Age: 52
End: 2022-12-24
Payer: COMMERCIAL

## 2022-12-24 DIAGNOSIS — D75.89 MACROCYTOSIS: ICD-10-CM

## 2022-12-24 DIAGNOSIS — E66.9 OBESITY (BMI 30-39.9): ICD-10-CM

## 2022-12-24 DIAGNOSIS — I10 PRIMARY HYPERTENSION: ICD-10-CM

## 2022-12-24 LAB
25(OH)D3 SERPL-MCNC: 34 NG/ML (ref 30–100)
CHOLEST SERPL-MCNC: 218 MG/DL (ref 100–199)
ERYTHROCYTE [DISTWIDTH] IN BLOOD BY AUTOMATED COUNT: 46.4 FL (ref 35.9–50)
EST. AVERAGE GLUCOSE BLD GHB EST-MCNC: 94 MG/DL
FASTING STATUS PATIENT QL REPORTED: NORMAL
FOLATE SERPL-MCNC: 6.4 NG/ML
HBA1C MFR BLD: 4.9 % (ref 4–5.6)
HCT VFR BLD AUTO: 40.6 % (ref 37–47)
HDLC SERPL-MCNC: 72 MG/DL
HGB BLD-MCNC: 13.9 G/DL (ref 12–16)
LDLC SERPL CALC-MCNC: 126 MG/DL
MCH RBC QN AUTO: 35.1 PG (ref 27–33)
MCHC RBC AUTO-ENTMCNC: 34.2 G/DL (ref 33.6–35)
MCV RBC AUTO: 102.5 FL (ref 81.4–97.8)
PLATELET # BLD AUTO: 268 K/UL (ref 164–446)
PMV BLD AUTO: 9.4 FL (ref 9–12.9)
RBC # BLD AUTO: 3.96 M/UL (ref 4.2–5.4)
TRIGL SERPL-MCNC: 98 MG/DL (ref 0–149)
TSH SERPL DL<=0.005 MIU/L-ACNC: 2.48 UIU/ML (ref 0.38–5.33)
VIT B12 SERPL-MCNC: 440 PG/ML (ref 211–911)
WBC # BLD AUTO: 7.1 K/UL (ref 4.8–10.8)

## 2022-12-24 PROCEDURE — 83036 HEMOGLOBIN GLYCOSYLATED A1C: CPT

## 2022-12-24 PROCEDURE — 82306 VITAMIN D 25 HYDROXY: CPT

## 2022-12-24 PROCEDURE — 82746 ASSAY OF FOLIC ACID SERUM: CPT

## 2022-12-24 PROCEDURE — 85027 COMPLETE CBC AUTOMATED: CPT

## 2022-12-24 PROCEDURE — 84443 ASSAY THYROID STIM HORMONE: CPT

## 2022-12-24 PROCEDURE — 82607 VITAMIN B-12: CPT

## 2022-12-24 PROCEDURE — 36415 COLL VENOUS BLD VENIPUNCTURE: CPT

## 2022-12-24 PROCEDURE — 80061 LIPID PANEL: CPT

## 2022-12-27 ENCOUNTER — OFFICE VISIT (OUTPATIENT)
Dept: MEDICAL GROUP | Facility: PHYSICIAN GROUP | Age: 52
End: 2022-12-27
Payer: COMMERCIAL

## 2022-12-27 VITALS
HEIGHT: 60 IN | TEMPERATURE: 98 F | DIASTOLIC BLOOD PRESSURE: 80 MMHG | OXYGEN SATURATION: 96 % | SYSTOLIC BLOOD PRESSURE: 136 MMHG | WEIGHT: 171.2 LBS | BODY MASS INDEX: 33.61 KG/M2 | HEART RATE: 87 BPM

## 2022-12-27 DIAGNOSIS — Z78.0 POST-MENOPAUSAL: ICD-10-CM

## 2022-12-27 DIAGNOSIS — Z12.31 ENCOUNTER FOR SCREENING MAMMOGRAM FOR BREAST CANCER: ICD-10-CM

## 2022-12-27 DIAGNOSIS — Z87.81 HISTORY OF PELVIC FRACTURE: ICD-10-CM

## 2022-12-27 DIAGNOSIS — S76.211A INGUINAL STRAIN, RIGHT, INITIAL ENCOUNTER: ICD-10-CM

## 2022-12-27 DIAGNOSIS — I10 PRIMARY HYPERTENSION: Primary | ICD-10-CM

## 2022-12-27 DIAGNOSIS — D75.89 MACROCYTOSIS: ICD-10-CM

## 2022-12-27 DIAGNOSIS — E66.9 OBESITY (BMI 30-39.9): ICD-10-CM

## 2022-12-27 DIAGNOSIS — E78.5 DYSLIPIDEMIA: ICD-10-CM

## 2022-12-27 DIAGNOSIS — Z12.11 SCREENING FOR COLON CANCER: ICD-10-CM

## 2022-12-27 PROCEDURE — 99214 OFFICE O/P EST MOD 30 MIN: CPT

## 2022-12-27 RX ORDER — ESCITALOPRAM OXALATE 10 MG/1
10 TABLET ORAL DAILY
Qty: 90 TABLET | Refills: 1 | Status: SHIPPED | OUTPATIENT
Start: 2022-12-27 | End: 2023-02-28 | Stop reason: SDUPTHER

## 2022-12-27 RX ORDER — IBUPROFEN 800 MG/1
800 TABLET ORAL EVERY 8 HOURS PRN
Qty: 30 TABLET | Refills: 0 | Status: SHIPPED | OUTPATIENT
Start: 2022-12-27 | End: 2023-01-23 | Stop reason: SDUPTHER

## 2022-12-27 RX ORDER — OLMESARTAN MEDOXOMIL 20 MG/1
20 TABLET ORAL DAILY
Qty: 90 TABLET | Refills: 3 | Status: SHIPPED | OUTPATIENT
Start: 2022-12-27 | End: 2023-04-11 | Stop reason: SDUPTHER

## 2022-12-27 ASSESSMENT — ENCOUNTER SYMPTOMS
WEAKNESS: 0
HEADACHES: 0
COUGH: 0
CONSTIPATION: 0
FALLS: 0
NAUSEA: 0
BLURRED VISION: 0
VOMITING: 0
WEIGHT LOSS: 0
CHILLS: 0
DIARRHEA: 0
ABDOMINAL PAIN: 0
DIZZINESS: 0
MYALGIAS: 0
FEVER: 0
SHORTNESS OF BREATH: 0

## 2022-12-27 NOTE — ASSESSMENT & PLAN NOTE
Chronic unstable  -Recommended 1200 mg calcium citrate/day, vitamin D3 2000 IU/day, and discussed risks of increased use of ibuprofen.   -bone density scan ordered     nataly

## 2022-12-27 NOTE — ASSESSMENT & PLAN NOTE
Chronic condition uncontrolled with LDL greater than goal of below 100  - Due to ASCVD risk score being low we will just continue to and encourage healthy diet and exercise at this time which has been discussed  - We will recheck lipid panel in June 2023

## 2022-12-27 NOTE — PROGRESS NOTES
Subjective:     CC: History of pelvic fracture and chronic Health topics    HPI:   Gabriela presents today with    Problem   History of Pelvic Fracture    Chronic unstable  -c/o pain and weight gain, and unable to work  -seen at [facility] followed Dr. Mart (last seen 11/22), recommended referral to Dr. Galarza for CT Scan due to not healing.        Dyslipidemia    Chronic not taking medication for this condition at this time  -The 10-year ASCVD risk score (Arnoldo ROMAN, et al., 2019) is: 2.3%  -Trying to eat healthy but is unable to exercise due to current healing pelvic fracture   Latest Reference Range & Units 12/24/22 08:49   Cholesterol,Tot 100 - 199 mg/dL 218 (H)   Triglycerides 0 - 149 mg/dL 98   HDL >=40 mg/dL 72   LDL <100 mg/dL 126 (H)        Primary Hypertension    Chronic condition stable with blood pressure today in clinic at 136/80.  Patient taking olmesartan 20 mg daily and doing well on this medication without reported side effects.  She denies dizziness lightheadedness headache or chest pain today     Macrocytosis    Chronic condition  -Patient admits to increased alcohol consumption over the holidays     Latest Reference Range & Units 12/24/22 08:49   RBC 4.20 - 5.40 M/uL 3.96 (L)   Hemoglobin 12.0 - 16.0 g/dL 13.9   Hematocrit 37.0 - 47.0 % 40.6   MCV 81.4 - 97.8 fL 102.5 (H)   MCH 27.0 - 33.0 pg 35.1 (H)      Latest Reference Range & Units 12/24/22 08:49   25-Hydroxy   Vitamin D 25 30 - 100 ng/mL 34   Folate -Folic Acid >4.0 ng/mL 6.4   Vitamin B12 -True Cobalamin 211 - 911 pg/mL 440        Obesity (Bmi 30-39.9)    Chronic condition-  -currently is 171 pounds and BMI is 33.44 which has increased since August in which weight was 160 pounds and patient's BMI was 31%.  -Unfortunately patient is unable to exercise due to pelvic fracture  -She has been indulging over the holidays and reports occasional excess drinking.         Health Maintenance: Recommend cervical cancer screening hep B vaccine and hep C  screening    ROS:  Review of Systems   Constitutional:  Negative for chills, fever, malaise/fatigue and weight loss.   Eyes:  Negative for blurred vision.   Respiratory:  Negative for cough and shortness of breath.    Cardiovascular:  Negative for chest pain.   Gastrointestinal:  Negative for abdominal pain, constipation, diarrhea, nausea and vomiting.   Musculoskeletal:  Positive for joint pain. Negative for falls and myalgias.   Neurological:  Negative for dizziness, weakness and headaches.     Objective:     Exam:  /80 (BP Location: Left arm, Patient Position: Sitting, BP Cuff Size: Adult)   Pulse 87   Temp 36.7 °C (98 °F) (Temporal)   Ht 1.524 m (5')   Wt 77.7 kg (171 lb 3.2 oz)   SpO2 96%   BMI 33.44 kg/m²  Body mass index is 33.44 kg/m².    Physical Exam  Constitutional:       General: She is not in acute distress.     Appearance: Normal appearance. She is not ill-appearing or toxic-appearing.   HENT:      Head: Normocephalic.   Eyes:      Conjunctiva/sclera: Conjunctivae normal.   Pulmonary:      Effort: Pulmonary effort is normal.   Skin:     General: Skin is warm and dry.   Neurological:      General: No focal deficit present.      Mental Status: She is alert and oriented to person, place, and time.   Psychiatric:         Mood and Affect: Mood normal.         Behavior: Behavior normal.       Labs:   Hospital Outpatient Visit on 12/24/2022   Component Date Value    Glycohemoglobin 12/24/2022 4.9     Est Avg Glucose 12/24/2022 94     25-Hydroxy   Vitamin D 25 12/24/2022 34     TSH 12/24/2022 2.480     Cholesterol,Tot 12/24/2022 218 (H)     Triglycerides 12/24/2022 98     HDL 12/24/2022 72     LDL 12/24/2022 126 (H)     WBC 12/24/2022 7.1     RBC 12/24/2022 3.96 (L)     Hemoglobin 12/24/2022 13.9     Hematocrit 12/24/2022 40.6     MCV 12/24/2022 102.5 (H)     MCH 12/24/2022 35.1 (H)     MCHC 12/24/2022 34.2     RDW 12/24/2022 46.4     Platelet Count 12/24/2022 268     MPV 12/24/2022 9.4     Folate  -Folic Acid 12/24/2022 6.4     Vitamin B12 -True Cobala* 12/24/2022 440     Fasting Status 12/24/2022 Fasting            Assessment & Plan: Medical Decision Making     52 y.o. female with the following -     Problem List Items Addressed This Visit       Primary hypertension - Primary     Chronic, controlled.  Blood pressure is at goal under 140/90 in the office today.   Medications: Olmesartan 20 mg daily, no adverse side effects noted.  We will continue the current regimen.  Recommend home blood pressure monitoring:  - check your blood pressure every day and put it in a log  - pick a different time each day so we can see how it varies throughout the day  - when you check your blood pressure: make sure you sit quietly for 5-10 minutes beforehand, keep both feet flat on the ground, and make sure you use an arm cuff at heart height    Lifestyle factors to help manage blood pressure:  1) reduce stress with daily activity, consider yoga, or meditation  2) reduce Na to <2000 mg/day-avoid putting extra salt on food, avoid packaged/processed foods, avoid excessive sugar intake  3) Mediterranean diet   4) 30 minutes of cardio and resistance training most days of the week, which you can build up to at least 150 min./week                 Relevant Medications    olmesartan (BENICAR) 20 MG Tab    Other Relevant Orders    CBC WITH DIFFERENTIAL    Comp Metabolic Panel    Lipid Profile    Macrocytosis     Chronic condition uncontrolled  - Recommend cutting back on alcohol consumption  - Okay to take over-the-counter B12 and folate supplementation along with multivitamin         Relevant Orders    CBC WITH DIFFERENTIAL    Obesity (BMI 30-39.9)     Chronic condition uncontrolled  - Strongly recommend decreasing amount of processed foods and sugar and increasing fruits and vegetables as well as lean protein consumption.  Recommend activity as tolerated as advised by orthopedic MD         History of pelvic fracture     Chronic  unstable  -Recommended 1200 mg calcium citrate/day, vitamin D3 2000 IU/day, and discussed risks of increased use of ibuprofen.   -bone density scan ordered           Relevant Medications    ibuprofen (MOTRIN) 800 MG Tab    escitalopram (LEXAPRO) 10 MG Tab    olmesartan (BENICAR) 20 MG Tab    Other Relevant Orders    DS-BONE DENSITY STUDY (DEXA)    COLOGUARD (FIT DNA)    Dyslipidemia     Chronic condition uncontrolled with LDL greater than goal of below 100  - Due to ASCVD risk score being low we will just continue to and encourage healthy diet and exercise at this time which has been discussed  - We will recheck lipid panel in June 2023         Relevant Orders    CBC WITH DIFFERENTIAL    Comp Metabolic Panel    Lipid Profile     Other Visit Diagnoses       Post-menopausal        Relevant Medications    ibuprofen (MOTRIN) 800 MG Tab    escitalopram (LEXAPRO) 10 MG Tab    Other Relevant Orders    DS-BONE DENSITY STUDY (DEXA)    Inguinal strain, right, initial encounter        Encounter for screening mammogram for breast cancer        Relevant Orders    MA-SCREENING MAMMO BILAT W/TOMOSYNTHESIS W/CAD    Screening for colon cancer        Relevant Orders    COLOGUARD (FIT DNA)            Differential diagnosis, natural history, supportive care, and indications for immediate follow-up discussed.  Shared decision making approach utilized, and patient is amendable with plan of care.  Patient understands to return to clinic or go to the emergency department if symptoms worsen. All questions and concerns addressed.    Return in about 6 months (around 6/27/2023), or if symptoms worsen or fail to improve, for F/U Lab Review.    Please note that this dictation was created using voice recognition software. I have made every reasonable attempt to correct obvious errors, but I expect that there are errors of grammar and possibly content that I did not discover before finalizing the note.

## 2022-12-28 NOTE — ASSESSMENT & PLAN NOTE
Chronic condition uncontrolled  - Strongly recommend decreasing amount of processed foods and sugar and increasing fruits and vegetables as well as lean protein consumption.  Recommend activity as tolerated as advised by orthopedic MD

## 2022-12-28 NOTE — ASSESSMENT & PLAN NOTE
Chronic condition uncontrolled  - Recommend cutting back on alcohol consumption  - Okay to take over-the-counter B12 and folate supplementation along with multivitamin

## 2022-12-28 NOTE — ASSESSMENT & PLAN NOTE
Chronic, controlled.  Blood pressure is at goal under 140/90 in the office today.   Medications: Olmesartan 20 mg daily, no adverse side effects noted.  We will continue the current regimen.  Recommend home blood pressure monitoring:  - check your blood pressure every day and put it in a log  - pick a different time each day so we can see how it varies throughout the day  - when you check your blood pressure: make sure you sit quietly for 5-10 minutes beforehand, keep both feet flat on the ground, and make sure you use an arm cuff at heart height    Lifestyle factors to help manage blood pressure:  1) reduce stress with daily activity, consider yoga, or meditation  2) reduce Na to <2000 mg/day-avoid putting extra salt on food, avoid packaged/processed foods, avoid excessive sugar intake  3) Mediterranean diet   4) 30 minutes of cardio and resistance training most days of the week, which you can build up to at least 150 min./week

## 2023-01-11 ENCOUNTER — HOSPITAL ENCOUNTER (OUTPATIENT)
Dept: RADIOLOGY | Facility: MEDICAL CENTER | Age: 53
End: 2023-01-11
Payer: COMMERCIAL

## 2023-01-20 ENCOUNTER — HOSPITAL ENCOUNTER (OUTPATIENT)
Dept: RADIOLOGY | Facility: MEDICAL CENTER | Age: 53
End: 2023-01-20
Attending: STUDENT IN AN ORGANIZED HEALTH CARE EDUCATION/TRAINING PROGRAM
Payer: COMMERCIAL

## 2023-01-20 DIAGNOSIS — S32.592K CLOSED BILATERAL FRACTURE OF PUBIC RAMI WITH NONUNION, SUBSEQUENT ENCOUNTER: ICD-10-CM

## 2023-01-20 DIAGNOSIS — S32.591K CLOSED BILATERAL FRACTURE OF PUBIC RAMI WITH NONUNION, SUBSEQUENT ENCOUNTER: ICD-10-CM

## 2023-01-20 PROCEDURE — A9579 GAD-BASE MR CONTRAST NOS,1ML: HCPCS | Performed by: STUDENT IN AN ORGANIZED HEALTH CARE EDUCATION/TRAINING PROGRAM

## 2023-01-20 PROCEDURE — 72197 MRI PELVIS W/O & W/DYE: CPT

## 2023-01-20 PROCEDURE — 700117 HCHG RX CONTRAST REV CODE 255: Performed by: STUDENT IN AN ORGANIZED HEALTH CARE EDUCATION/TRAINING PROGRAM

## 2023-01-20 RX ADMIN — GADOTERIDOL 15 ML: 279.3 INJECTION, SOLUTION INTRAVENOUS at 11:17

## 2023-01-23 DIAGNOSIS — Z78.0 POST-MENOPAUSAL: ICD-10-CM

## 2023-01-23 DIAGNOSIS — I10 PRIMARY HYPERTENSION: ICD-10-CM

## 2023-01-23 DIAGNOSIS — Z87.81 HISTORY OF PELVIC FRACTURE: ICD-10-CM

## 2023-01-23 RX ORDER — OLMESARTAN MEDOXOMIL 20 MG/1
20 TABLET ORAL DAILY
Qty: 90 TABLET | Refills: 3 | Status: CANCELLED | OUTPATIENT
Start: 2023-01-23

## 2023-01-23 RX ORDER — IBUPROFEN 800 MG/1
800 TABLET ORAL EVERY 8 HOURS PRN
Qty: 30 TABLET | Refills: 0 | Status: SHIPPED | OUTPATIENT
Start: 2023-01-23 | End: 2023-03-10 | Stop reason: SDUPTHER

## 2023-01-23 RX ORDER — ESCITALOPRAM OXALATE 10 MG/1
10 TABLET ORAL DAILY
Qty: 90 TABLET | Refills: 1 | Status: CANCELLED | OUTPATIENT
Start: 2023-01-23

## 2023-01-23 NOTE — TELEPHONE ENCOUNTER
Received request via: Patient    Was the patient seen in the last year in this department? Yes    Does the patient have an active prescription (recently filled or refills available) for medication(s) requested? No    Does the patient have [facility] and need 100 day supply (blood pressure, diabetes and cholesterol meds only)? Patient does not have SCP

## 2023-01-26 PROBLEM — S32.811K: Status: ACTIVE | Noted: 2023-01-26

## 2023-02-01 ENCOUNTER — PRE-ADMISSION TESTING (OUTPATIENT)
Dept: ADMISSIONS | Facility: MEDICAL CENTER | Age: 53
End: 2023-02-01
Attending: STUDENT IN AN ORGANIZED HEALTH CARE EDUCATION/TRAINING PROGRAM
Payer: COMMERCIAL

## 2023-02-01 DIAGNOSIS — Z01.812 PRE-OPERATIVE LABORATORY EXAMINATION: ICD-10-CM

## 2023-02-01 LAB
ANION GAP SERPL CALC-SCNC: 18 MMOL/L (ref 7–16)
BUN SERPL-MCNC: 22 MG/DL (ref 8–22)
CALCIUM SERPL-MCNC: 10.2 MG/DL (ref 8.5–10.5)
CHLORIDE SERPL-SCNC: 103 MMOL/L (ref 96–112)
CO2 SERPL-SCNC: 19 MMOL/L (ref 20–33)
CREAT SERPL-MCNC: 1 MG/DL (ref 0.5–1.4)
GFR SERPLBLD CREATININE-BSD FMLA CKD-EPI: 67 ML/MIN/1.73 M 2
GLUCOSE SERPL-MCNC: 84 MG/DL (ref 65–99)
POTASSIUM SERPL-SCNC: 4 MMOL/L (ref 3.6–5.5)
SODIUM SERPL-SCNC: 140 MMOL/L (ref 135–145)

## 2023-02-01 PROCEDURE — 80048 BASIC METABOLIC PNL TOTAL CA: CPT

## 2023-02-01 PROCEDURE — 36415 COLL VENOUS BLD VENIPUNCTURE: CPT

## 2023-02-02 ENCOUNTER — HOSPITAL ENCOUNTER (OUTPATIENT)
Dept: RADIOLOGY | Facility: MEDICAL CENTER | Age: 53
End: 2023-02-02
Payer: COMMERCIAL

## 2023-02-02 DIAGNOSIS — Z78.0 POST-MENOPAUSAL: ICD-10-CM

## 2023-02-02 DIAGNOSIS — Z87.81 HISTORY OF PELVIC FRACTURE: ICD-10-CM

## 2023-02-02 DIAGNOSIS — Z12.31 ENCOUNTER FOR SCREENING MAMMOGRAM FOR BREAST CANCER: ICD-10-CM

## 2023-02-02 PROCEDURE — 77063 BREAST TOMOSYNTHESIS BI: CPT

## 2023-02-02 PROCEDURE — 77080 DXA BONE DENSITY AXIAL: CPT

## 2023-02-07 ENCOUNTER — ANESTHESIA (OUTPATIENT)
Dept: SURGERY | Facility: MEDICAL CENTER | Age: 53
End: 2023-02-07
Payer: COMMERCIAL

## 2023-02-07 ENCOUNTER — HOSPITAL ENCOUNTER (OUTPATIENT)
Facility: MEDICAL CENTER | Age: 53
End: 2023-02-07
Attending: STUDENT IN AN ORGANIZED HEALTH CARE EDUCATION/TRAINING PROGRAM | Admitting: STUDENT IN AN ORGANIZED HEALTH CARE EDUCATION/TRAINING PROGRAM
Payer: COMMERCIAL

## 2023-02-07 ENCOUNTER — APPOINTMENT (OUTPATIENT)
Dept: RADIOLOGY | Facility: MEDICAL CENTER | Age: 53
End: 2023-02-07
Attending: STUDENT IN AN ORGANIZED HEALTH CARE EDUCATION/TRAINING PROGRAM
Payer: COMMERCIAL

## 2023-02-07 ENCOUNTER — ANESTHESIA EVENT (OUTPATIENT)
Dept: SURGERY | Facility: MEDICAL CENTER | Age: 53
End: 2023-02-07
Payer: COMMERCIAL

## 2023-02-07 VITALS
HEART RATE: 89 BPM | BODY MASS INDEX: 33.63 KG/M2 | OXYGEN SATURATION: 98 % | TEMPERATURE: 96.8 F | SYSTOLIC BLOOD PRESSURE: 133 MMHG | HEIGHT: 60 IN | DIASTOLIC BLOOD PRESSURE: 79 MMHG | WEIGHT: 171.3 LBS | RESPIRATION RATE: 18 BRPM

## 2023-02-07 DIAGNOSIS — S32.811K: ICD-10-CM

## 2023-02-07 PROCEDURE — 160048 HCHG OR STATISTICAL LEVEL 1-5: Performed by: STUDENT IN AN ORGANIZED HEALTH CARE EDUCATION/TRAINING PROGRAM

## 2023-02-07 PROCEDURE — 160036 HCHG PACU - EA ADDL 30 MINS PHASE I: Performed by: STUDENT IN AN ORGANIZED HEALTH CARE EDUCATION/TRAINING PROGRAM

## 2023-02-07 PROCEDURE — 700111 HCHG RX REV CODE 636 W/ 250 OVERRIDE (IP): Performed by: ANESTHESIOLOGY

## 2023-02-07 PROCEDURE — 160042 HCHG SURGERY MINUTES - EA ADDL 1 MIN LEVEL 5: Performed by: STUDENT IN AN ORGANIZED HEALTH CARE EDUCATION/TRAINING PROGRAM

## 2023-02-07 PROCEDURE — 160046 HCHG PACU - 1ST 60 MINS PHASE II: Performed by: STUDENT IN AN ORGANIZED HEALTH CARE EDUCATION/TRAINING PROGRAM

## 2023-02-07 PROCEDURE — 700101 HCHG RX REV CODE 250: Performed by: ANESTHESIOLOGY

## 2023-02-07 PROCEDURE — 160009 HCHG ANES TIME/MIN: Performed by: STUDENT IN AN ORGANIZED HEALTH CARE EDUCATION/TRAINING PROGRAM

## 2023-02-07 PROCEDURE — 01120 ANES PX ON BONY PELVIS: CPT | Performed by: ANESTHESIOLOGY

## 2023-02-07 PROCEDURE — 93005 ELECTROCARDIOGRAM TRACING: CPT | Performed by: ANESTHESIOLOGY

## 2023-02-07 PROCEDURE — 700102 HCHG RX REV CODE 250 W/ 637 OVERRIDE(OP): Performed by: ANESTHESIOLOGY

## 2023-02-07 PROCEDURE — C1729 CATH, DRAINAGE: HCPCS | Performed by: STUDENT IN AN ORGANIZED HEALTH CARE EDUCATION/TRAINING PROGRAM

## 2023-02-07 PROCEDURE — 502240 HCHG MISC OR SUPPLY RC 0272: Performed by: STUDENT IN AN ORGANIZED HEALTH CARE EDUCATION/TRAINING PROGRAM

## 2023-02-07 PROCEDURE — 27217 TREAT PELVIC RING FRACTURE: CPT | Mod: RT | Performed by: STUDENT IN AN ORGANIZED HEALTH CARE EDUCATION/TRAINING PROGRAM

## 2023-02-07 PROCEDURE — 27217 TREAT PELVIC RING FRACTURE: CPT | Mod: 80ROC,RT | Performed by: STUDENT IN AN ORGANIZED HEALTH CARE EDUCATION/TRAINING PROGRAM

## 2023-02-07 PROCEDURE — 72190 X-RAY EXAM OF PELVIS: CPT

## 2023-02-07 PROCEDURE — 160025 RECOVERY II MINUTES (STATS): Performed by: STUDENT IN AN ORGANIZED HEALTH CARE EDUCATION/TRAINING PROGRAM

## 2023-02-07 PROCEDURE — C1713 ANCHOR/SCREW BN/BN,TIS/BN: HCPCS | Performed by: STUDENT IN AN ORGANIZED HEALTH CARE EDUCATION/TRAINING PROGRAM

## 2023-02-07 PROCEDURE — 160035 HCHG PACU - 1ST 60 MINS PHASE I: Performed by: STUDENT IN AN ORGANIZED HEALTH CARE EDUCATION/TRAINING PROGRAM

## 2023-02-07 PROCEDURE — 502000 HCHG MISC OR IMPLANTS RC 0278: Performed by: STUDENT IN AN ORGANIZED HEALTH CARE EDUCATION/TRAINING PROGRAM

## 2023-02-07 PROCEDURE — 110371 HCHG SHELL REV 272: Performed by: STUDENT IN AN ORGANIZED HEALTH CARE EDUCATION/TRAINING PROGRAM

## 2023-02-07 PROCEDURE — 700105 HCHG RX REV CODE 258: Performed by: STUDENT IN AN ORGANIZED HEALTH CARE EDUCATION/TRAINING PROGRAM

## 2023-02-07 PROCEDURE — 700101 HCHG RX REV CODE 250: Performed by: STUDENT IN AN ORGANIZED HEALTH CARE EDUCATION/TRAINING PROGRAM

## 2023-02-07 PROCEDURE — A9270 NON-COVERED ITEM OR SERVICE: HCPCS | Performed by: ANESTHESIOLOGY

## 2023-02-07 PROCEDURE — 160031 HCHG SURGERY MINUTES - 1ST 30 MINS LEVEL 5: Performed by: STUDENT IN AN ORGANIZED HEALTH CARE EDUCATION/TRAINING PROGRAM

## 2023-02-07 PROCEDURE — 160002 HCHG RECOVERY MINUTES (STAT): Performed by: STUDENT IN AN ORGANIZED HEALTH CARE EDUCATION/TRAINING PROGRAM

## 2023-02-07 DEVICE — IMPLANTABLE DEVICE: Type: IMPLANTABLE DEVICE | Site: PELVIS | Status: FUNCTIONAL

## 2023-02-07 RX ORDER — HYDRALAZINE HYDROCHLORIDE 20 MG/ML
5 INJECTION INTRAMUSCULAR; INTRAVENOUS
Status: DISCONTINUED | OUTPATIENT
Start: 2023-02-07 | End: 2023-02-07 | Stop reason: HOSPADM

## 2023-02-07 RX ORDER — MIDAZOLAM HYDROCHLORIDE 1 MG/ML
INJECTION INTRAMUSCULAR; INTRAVENOUS PRN
Status: DISCONTINUED | OUTPATIENT
Start: 2023-02-07 | End: 2023-02-07 | Stop reason: SURG

## 2023-02-07 RX ORDER — HALOPERIDOL 5 MG/ML
1 INJECTION INTRAMUSCULAR
Status: DISCONTINUED | OUTPATIENT
Start: 2023-02-07 | End: 2023-02-07 | Stop reason: HOSPADM

## 2023-02-07 RX ORDER — MULTIVIT WITH MINERALS/LUTEIN
TABLET ORAL
COMMUNITY

## 2023-02-07 RX ORDER — DIPHENHYDRAMINE HYDROCHLORIDE 50 MG/ML
12.5 INJECTION INTRAMUSCULAR; INTRAVENOUS
Status: DISCONTINUED | OUTPATIENT
Start: 2023-02-07 | End: 2023-02-07 | Stop reason: HOSPADM

## 2023-02-07 RX ORDER — CEFAZOLIN SODIUM 1 G/3ML
2 INJECTION, POWDER, FOR SOLUTION INTRAMUSCULAR; INTRAVENOUS ONCE
Status: DISCONTINUED | OUTPATIENT
Start: 2023-02-07 | End: 2023-02-07 | Stop reason: HOSPADM

## 2023-02-07 RX ORDER — HYDROMORPHONE HYDROCHLORIDE 1 MG/ML
0.5 INJECTION, SOLUTION INTRAMUSCULAR; INTRAVENOUS; SUBCUTANEOUS
Status: DISCONTINUED | OUTPATIENT
Start: 2023-02-07 | End: 2023-02-07 | Stop reason: HOSPADM

## 2023-02-07 RX ORDER — SODIUM CHLORIDE, SODIUM LACTATE, POTASSIUM CHLORIDE, CALCIUM CHLORIDE 600; 310; 30; 20 MG/100ML; MG/100ML; MG/100ML; MG/100ML
INJECTION, SOLUTION INTRAVENOUS CONTINUOUS
Status: DISCONTINUED | OUTPATIENT
Start: 2023-02-07 | End: 2023-02-07 | Stop reason: HOSPADM

## 2023-02-07 RX ORDER — ACETAMINOPHEN 500 MG
500-1000 TABLET ORAL EVERY 6 HOURS PRN
COMMUNITY

## 2023-02-07 RX ORDER — HYDROMORPHONE HYDROCHLORIDE 1 MG/ML
0.2 INJECTION, SOLUTION INTRAMUSCULAR; INTRAVENOUS; SUBCUTANEOUS
Status: DISCONTINUED | OUTPATIENT
Start: 2023-02-07 | End: 2023-02-07 | Stop reason: HOSPADM

## 2023-02-07 RX ORDER — ONDANSETRON 2 MG/ML
INJECTION INTRAMUSCULAR; INTRAVENOUS PRN
Status: DISCONTINUED | OUTPATIENT
Start: 2023-02-07 | End: 2023-02-07 | Stop reason: SURG

## 2023-02-07 RX ORDER — LIDOCAINE HYDROCHLORIDE 20 MG/ML
INJECTION, SOLUTION EPIDURAL; INFILTRATION; INTRACAUDAL; PERINEURAL PRN
Status: DISCONTINUED | OUTPATIENT
Start: 2023-02-07 | End: 2023-02-07 | Stop reason: SURG

## 2023-02-07 RX ORDER — DEXAMETHASONE SODIUM PHOSPHATE 4 MG/ML
INJECTION, SOLUTION INTRA-ARTICULAR; INTRALESIONAL; INTRAMUSCULAR; INTRAVENOUS; SOFT TISSUE PRN
Status: DISCONTINUED | OUTPATIENT
Start: 2023-02-07 | End: 2023-02-07 | Stop reason: SURG

## 2023-02-07 RX ORDER — OXYCODONE HCL 5 MG/5 ML
5 SOLUTION, ORAL ORAL
Status: COMPLETED | OUTPATIENT
Start: 2023-02-07 | End: 2023-02-07

## 2023-02-07 RX ORDER — HYDROCODONE BITARTRATE AND ACETAMINOPHEN 5; 325 MG/1; MG/1
1 TABLET ORAL EVERY 4 HOURS PRN
Qty: 20 TABLET | Refills: 0 | Status: SHIPPED | OUTPATIENT
Start: 2023-02-07 | End: 2023-02-12

## 2023-02-07 RX ORDER — LABETALOL HYDROCHLORIDE 5 MG/ML
5 INJECTION, SOLUTION INTRAVENOUS
Status: DISCONTINUED | OUTPATIENT
Start: 2023-02-07 | End: 2023-02-07 | Stop reason: HOSPADM

## 2023-02-07 RX ORDER — CEFAZOLIN SODIUM 1 G/3ML
INJECTION, POWDER, FOR SOLUTION INTRAMUSCULAR; INTRAVENOUS PRN
Status: DISCONTINUED | OUTPATIENT
Start: 2023-02-07 | End: 2023-02-07 | Stop reason: SURG

## 2023-02-07 RX ORDER — OXYCODONE HCL 5 MG/5 ML
10 SOLUTION, ORAL ORAL
Status: COMPLETED | OUTPATIENT
Start: 2023-02-07 | End: 2023-02-07

## 2023-02-07 RX ORDER — HYDROMORPHONE HYDROCHLORIDE 1 MG/ML
0.4 INJECTION, SOLUTION INTRAMUSCULAR; INTRAVENOUS; SUBCUTANEOUS
Status: DISCONTINUED | OUTPATIENT
Start: 2023-02-07 | End: 2023-02-07 | Stop reason: HOSPADM

## 2023-02-07 RX ORDER — ROCURONIUM BROMIDE 10 MG/ML
INJECTION, SOLUTION INTRAVENOUS PRN
Status: DISCONTINUED | OUTPATIENT
Start: 2023-02-07 | End: 2023-02-07 | Stop reason: SURG

## 2023-02-07 RX ADMIN — HYDROMORPHONE HYDROCHLORIDE 0.5 MG: 1 INJECTION, SOLUTION INTRAMUSCULAR; INTRAVENOUS; SUBCUTANEOUS at 08:51

## 2023-02-07 RX ADMIN — CEFAZOLIN 2 G: 330 INJECTION, POWDER, FOR SOLUTION INTRAMUSCULAR; INTRAVENOUS at 07:10

## 2023-02-07 RX ADMIN — ONDANSETRON 4 MG: 2 INJECTION INTRAMUSCULAR; INTRAVENOUS at 08:06

## 2023-02-07 RX ADMIN — EPHEDRINE SULFATE 10 MG: 50 INJECTION INTRAMUSCULAR; INTRAVENOUS; SUBCUTANEOUS at 07:44

## 2023-02-07 RX ADMIN — HYDROMORPHONE HYDROCHLORIDE 0.5 MG: 1 INJECTION, SOLUTION INTRAMUSCULAR; INTRAVENOUS; SUBCUTANEOUS at 09:13

## 2023-02-07 RX ADMIN — EPHEDRINE SULFATE 5 MG: 50 INJECTION INTRAMUSCULAR; INTRAVENOUS; SUBCUTANEOUS at 07:29

## 2023-02-07 RX ADMIN — FENTANYL CITRATE 50 MCG: 50 INJECTION, SOLUTION INTRAMUSCULAR; INTRAVENOUS at 07:07

## 2023-02-07 RX ADMIN — FENTANYL CITRATE 50 MCG: 50 INJECTION, SOLUTION INTRAMUSCULAR; INTRAVENOUS at 07:17

## 2023-02-07 RX ADMIN — FENTANYL CITRATE 50 MCG: 50 INJECTION, SOLUTION INTRAMUSCULAR; INTRAVENOUS at 08:25

## 2023-02-07 RX ADMIN — FENTANYL CITRATE 50 MCG: 50 INJECTION, SOLUTION INTRAMUSCULAR; INTRAVENOUS at 08:39

## 2023-02-07 RX ADMIN — SUGAMMADEX 200 MG: 100 INJECTION, SOLUTION INTRAVENOUS at 08:08

## 2023-02-07 RX ADMIN — LIDOCAINE HYDROCHLORIDE 0.5 ML: 10 INJECTION, SOLUTION EPIDURAL; INFILTRATION; INTRACAUDAL at 06:43

## 2023-02-07 RX ADMIN — OXYCODONE HYDROCHLORIDE 10 MG: 5 SOLUTION ORAL at 08:25

## 2023-02-07 RX ADMIN — PROPOFOL 150 MG: 10 INJECTION, EMULSION INTRAVENOUS at 07:07

## 2023-02-07 RX ADMIN — ROCURONIUM BROMIDE 50 MG: 10 INJECTION, SOLUTION INTRAVENOUS at 07:08

## 2023-02-07 RX ADMIN — DEXAMETHASONE SODIUM PHOSPHATE 8 MG: 4 INJECTION, SOLUTION INTRA-ARTICULAR; INTRALESIONAL; INTRAMUSCULAR; INTRAVENOUS; SOFT TISSUE at 07:10

## 2023-02-07 RX ADMIN — LIDOCAINE HYDROCHLORIDE 80 MG: 20 INJECTION, SOLUTION EPIDURAL; INFILTRATION; INTRACAUDAL at 07:07

## 2023-02-07 RX ADMIN — SODIUM CHLORIDE, POTASSIUM CHLORIDE, SODIUM LACTATE AND CALCIUM CHLORIDE: 600; 310; 30; 20 INJECTION, SOLUTION INTRAVENOUS at 06:43

## 2023-02-07 RX ADMIN — MIDAZOLAM HYDROCHLORIDE 2 MG: 1 INJECTION, SOLUTION INTRAMUSCULAR; INTRAVENOUS at 07:04

## 2023-02-07 ASSESSMENT — PAIN DESCRIPTION - PAIN TYPE
TYPE: SURGICAL PAIN
TYPE: ACUTE PAIN
TYPE: SURGICAL PAIN

## 2023-02-07 NOTE — PROGRESS NOTES
Patient in pre-op, assessment completed, patient and  Isra updated on plan of care, all questions answered, no further needs at this time, call light within reach.

## 2023-02-07 NOTE — ANESTHESIA PREPROCEDURE EVALUATION
Case: [id number] Date/Time: 02/07/23 0645    Procedure: RIGHT PERCUTANEOUS FIXATION ANTERIOR PELVIC RING (Right)    Diagnosis: Multiple fractures of pelvis with unstable disruption of pelvic ring, subsequent encounter for fracture with nonunion [S32.811K]    Pre-op diagnosis: Percutaneous fixation right anterior pelvic ring    Location: [facility] OR 16 / SURGERY [facility]    Surgeons: Geovanni Marcial M.D.          Relevant Problems   CARDIAC   (positive) Primary hypertension      Other   (positive) Multiple fractures of pelvis with unstable disruption of pelvic ring, subsequent encounter for fracture with nonunion   (positive) Obesity (BMI 30-39.9)     Anes H&P:  PAST MEDICAL HISTORY:   53 y.o. female who presents for Procedure(s) (LRB):  RIGHT PERCUTANEOUS FIXATION ANTERIOR PELVIC RING (Right).  She has current and past medical problems significant for:    Past Medical History:   Diagnosis Date   • Bilateral ovarian cysts    • Hypertension    • Murmur, cardiac     no cardiologist   • Osteopenia        SMOKING/ALCOHOL/RECREATIONAL DRUG USE:  Social History     Tobacco Use   • Smoking status: Never   • Smokeless tobacco: Never   Vaping Use   • Vaping Use: Never used   Substance Use Topics   • Alcohol use: Yes     Alcohol/week: 1.2 oz     Types: 2 Cans of beer per week     Comment: 2-4 a week   • Drug use: Never     Social History     Substance and Sexual Activity   Drug Use Never       PAST SURGICAL HISTORY:  Past Surgical History:   Procedure Laterality Date   • GYN LAPAROSCOPY  2020       ALLERGIES:   No Known Allergies    MEDICATIONS:  No current facility-administered medications on file prior to encounter.     Current Outpatient Medications on File Prior to Encounter   Medication Sig Dispense Refill   • acetaminophen (TYLENOL) 500 MG Tab Take 500-1,000 mg by mouth every 6 hours as needed.     • Calcium Carbonate-Vit D-Min (CALCIUM 1200 PO) Take  by mouth.     • Cyanocobalamin (VITAMIN B 12 PO) Take  by mouth.     •  Ascorbic Acid (VITAMIN C) 1000 MG Tab Take  by mouth.     • ibuprofen (MOTRIN) 800 MG Tab Take 1 Tablet by mouth every 8 hours as needed for Moderate Pain. 30 Tablet 0   • escitalopram (LEXAPRO) 10 MG Tab Take 1 Tablet by mouth every day. 90 Tablet 1   • olmesartan (BENICAR) 20 MG Tab Take 1 Tablet by mouth every day. 90 Tablet 3       LABS:  Lab Results   Component Value Date/Time    HEMOGLOBIN 13.9 12/24/2022 0849    HEMATOCRIT 40.6 12/24/2022 0849    WBC 7.1 12/24/2022 0849     Lab Results   Component Value Date/Time    SODIUM 140 02/01/2023 1522    POTASSIUM 4.0 02/01/2023 1522    CHLORIDE 103 02/01/2023 1522    CO2 19 (L) 02/01/2023 1522    GLUCOSE 84 02/01/2023 1522    BUN 22 02/01/2023 1522    CALCIUM 10.2 02/01/2023 1522         PREVIOUS ANESTHETICS: See EMR  __________________________________________      Physical Exam    Airway   Mallampati: II  TM distance: >3 FB  Neck ROM: full       Cardiovascular - normal exam  Rhythm: regular  Rate: normal  (-) murmur     Dental - normal exam           Pulmonary - normal exam  Breath sounds clear to auscultation     Abdominal    Neurological - normal exam                 Anesthesia Plan    ASA 2       Plan - general       Airway plan will be ETT          Induction: intravenous    Postoperative Plan: Postoperative administration of opioids is intended.    Pertinent diagnostic labs and testing reviewed    Informed Consent:    Anesthetic plan and risks discussed with patient.    Use of blood products discussed with: patient whom consented to blood products.

## 2023-02-07 NOTE — OP REPORT
DATE OF OPERATION: 2/7/2023     PREOPERATIVE DIAGNOSIS: Right pubic ramus nonunion    POSTOPERATIVE DIAGNOSIS: Same    PROCEDURE PERFORMED: Right superior pubic ramus fixation with 6.5 mm cannulated screw    SURGEON: Geovanni Marcial M.D.     ASSISTANT: Dr. Joe Shea MD, fellow    ANESTHESIA: General    SPECIMEN: None    ESTIMATED BLOOD LOSS: Minimal mL    IMPLANTS: [place] 6.5 mm cannulated screw fully threaded      INDICATIONS: The patient is a 53 y.o. female who presented with above.  I discussed the risks and benefits of the procedure which include but are not limited to risks of infection, wound healing complication, neurovascular injury, pain, malunion, non-union, malrotation, and the medical risks of anesthesia including MI, stroke, and death.  Alternatives to surgery were also discussed, including non-operative management, which I did not recommend.  The patient was in agreement with the plan to proceed, and the informed consent was signed and documented.  I met with the patient pre-operatively and marked the operative extremity with their agreement.  We proceeded to the operating room.     DESCRIPTION OF PROCEDURE:  Patient was seen in the preoperative holding area on the day of surgery. The operative site was marked with my initials.  she was taken to the operating room and placed supine on the operative table.  Anesthesia was induced.  The operative extremity was prepped and draped in the normal sterile fashion.  Operative pause was conducted and the correct patient, site, side, procedure, and surgeon's initials on extremity were identified.  Using combination of inlet and obturator outlet views appropriate trajectory was obtained with a guidewire.  Start site was obtained and advanced to the nonunion site.  The cannulated drill was then passed over the wire and used to secure the start site and trajectory.  Next a bent guidewire was then placed to allow for guided passage due to the  difficult trajectory needed.  Once this was passed the nonunion site and noted to be in good bone the drill was then passed over this wire.  Next everything was removed and the intact guidewire was then placed and malleted down to the lateral iliac cortex.  This was then measured and the appropriate sized fully threaded 6.5 millimeters screw was passed.  This was again ensured safe intraosseous passage by using AP, inlet, obturator outlet views.  Wound was then irrigated and closed.  Sterile dressings were applied.  Patient was woken taken to PACU stable condition.    POSTOPERATIVE PLAN: Weightbearing as tolerated bilateral lower extremities.  Dressings down postop day 3.  Discharge from PACU today.  Follow-up in 2 weeks for suture removal.    ____________________________________   Geovanni Marcial M.D.   DD: 2/7/2023  8:09 AM

## 2023-02-07 NOTE — ANESTHESIA POSTPROCEDURE EVALUATION
Patient: Gabriela Carrillo    Procedure Summary     Date: 02/07/23 Room / Location: [place] [address] / SURGERY [facility]    Anesthesia Start: 0703 Anesthesia Stop: 0826    Procedure: RIGHT PERCUTANEOUS FIXATION ANTERIOR PELVIC RING (Right: Pelvis) Diagnosis:       Multiple fractures of pelvis with unstable disruption of pelvic ring, subsequent encounter for fracture with nonunion      (Percutaneous fixation right anterior pelvic ring)    Surgeons: Geovanni Marcial M.D. Responsible Provider: Korey Abreu M.D.    Anesthesia Type: general ASA Status: 2          Final Anesthesia Type: general  Last vitals  BP   Blood Pressure: 139/63    Temp   36.9 °C (98.4 °F)    Pulse   74   Resp   17    SpO2   100 %      Anesthesia Post Evaluation    Patient location during evaluation: PACU  Patient participation: complete - patient participated  Level of consciousness: sleepy but conscious    Airway patency: patent  Anesthetic complications: no  Cardiovascular status: hemodynamically stable  Respiratory status: acceptable  Hydration status: balanced    PONV: none          No notable events documented.     Nurse Pain Score: 8 (NPRS)

## 2023-02-07 NOTE — DISCHARGE INSTRUCTIONS
HOME CARE INSTRUCTIONS    ACTIVITY: Rest and take it easy for the first 24 hours.  A responsible adult is recommended to remain with you during that time.  It is normal to feel sleepy.  We encourage you to not do anything that requires balance, judgment or coordination.    FOR 24 HOURS DO NOT:  Drive, operate machinery or run household appliances.  Drink beer or alcoholic beverages.  Make important decisions or sign legal documents.    SPECIAL INSTRUCTIONS:     Okay to ambulate and weight-bear on this side as tolerated  Dressings may come off postop day 3, okay to shower at that time  No bathing or soaking  Call to schedule follow-up appointment for 2 weeks after surgery for suture removal  Take pain medication as needed    DIET: To avoid nausea, slowly advance diet as tolerated, avoiding spicy or greasy foods for the first day.  Add more substantial food to your diet according to your physician's instructions. INCREASE FLUIDS AND FIBER TO AVOID CONSTIPATION.    MEDICATIONS: Resume taking daily medication.  Take prescribed pain medication with food.  If no medication is prescribed, you may take non-aspirin pain medication if needed.  PAIN MEDICATION CAN BE VERY CONSTIPATING.  Take a stool softener or laxative such as senokot, pericolace, or milk of magnesia if needed.    Prescription given for Norco.  Last pain medication given at 8:25mg (10mg oxycodone).    A follow-up appointment should be arranged with your doctor; call to schedule.    You should CALL YOUR PHYSICIAN if you develop:  Fever greater than 101 degrees F.  Pain not relieved by medication, or persistent nausea or vomiting.  Excessive bleeding (blood soaking through dressing) or unexpected drainage from the wound.  Extreme redness or swelling around the incision site, drainage of pus or foul smelling drainage.  Inability to urinate or empty your bladder within 8 hours.  Problems with breathing or chest pain.    You should call 911 if you develop problems  with breathing or chest pain.  If you are unable to contact your doctor or surgical center, you should go to the nearest emergency room or urgent care center.  Physician's telephone #: [phone]    MILD FLU-LIKE SYMPTOMS ARE NORMAL.  YOU MAY EXPERIENCE GENERALIZED MUSCLE ACHES, THROAT IRRITATION, HEADACHE AND/OR SOME NAUSEA.    If any questions arise, call your doctor.  If your doctor is not available, please feel free to call the Surgical Center at [phone].  The Center is open Monday through Friday from 7AM to 7PM.      A registered nurse may call you a few days after your surgery to see how you are doing after your procedure.    You may also receive a survey in the mail within the next two weeks and we ask that you take a few moments to complete the survey and return it to us.  Our goal is to provide you with very good care and we value your comments.     Depression / Suicide Risk    As you are discharged from this [facility] facility, it is important to learn how to keep safe from harming yourself.    Recognize the warning signs:  Abrupt changes in personality, positive or negative- including increase in energy   Giving away possessions  Change in eating patterns- significant weight changes-  positive or negative  Change in sleeping patterns- unable to sleep or sleeping all the time   Unwillingness or inability to communicate  Depression  Unusual sadness, discouragement and loneliness  Talk of wanting to die  Neglect of personal appearance   Rebelliousness- reckless behavior  Withdrawal from people/activities they love  Confusion- inability to concentrate     If you or a loved one observes any of these behaviors or has concerns about self-harm, here's what you can do:  Talk about it- your feelings and reasons for harming yourself  Remove any means that you might use to hurt yourself (examples: pills, rope, extension cords, firearm)  Get professional help from the community (Mental Health, Substance  Abuse, psychological counseling)  Do not be alone:Call your Safe Contact- someone whom you trust who will be there for you.  Call your local CRISIS HOTLINE 177-4545 or 359-880-8359  Call your local Children's Mobile Crisis Response Team [place] (833) 385-4085 or www.[employer]  Call the toll free National Suicide Prevention Hotlines   National Suicide Prevention Lifeline 950-066-ZHSY (3828)  [place] [employer] Line Network 800-SUICIDE (907-2595)    I acknowledge receipt and understanding of these Home Care instructions.

## 2023-02-07 NOTE — OR NURSING
Report called to Kalyan HERNANDEZ. Plan of care discussed. Patient reports tolerable 3/10 burning at incision site. No complaints of nausea. Patient expresses readiness for discharge.Dressing CDI with ice pack in place.

## 2023-02-07 NOTE — ANESTHESIA PROCEDURE NOTES
Airway    Date/Time: 2/7/2023 7:09 AM  Performed by: Korey Abreu M.D.  Authorized by: Korey Abreu M.D.     Location:  OR  Urgency:  Elective  Difficult Airway: No    Indications for Airway Management:  Anesthesia      Spontaneous Ventilation: absent    Sedation Level:  Deep  Preoxygenated: Yes    Patient Position:  Sniffing  Mask Difficulty Assessment:  1 - vent by mask  Final Airway Type:  Endotracheal airway  Final Endotracheal Airway:  ETT  Cuffed: Yes    Technique Used for Successful ETT Placement:  Direct laryngoscopy    Insertion Site:  Oral  Blade Type:  Brand  Laryngoscope Blade/Videolaryngoscope Blade Size:  2  ETT Size (mm):  7.0  Measured from:  Teeth  ETT to Teeth (cm):  21  Placement Verified by: auscultation and capnometry    Cormack-Lehane Classification:  Grade IIa - partial view of glottis  Number of Attempts at Approach:  1

## 2023-02-07 NOTE — LETTER
January 26, 2023    Patient Name: Gabriela Carrillo  Surgeon Name: Geovanni Marcial M.D.  Surgery Facility: [facility] ([address] [place] [address])  Surgery Date: 2/7/2023    The time of your surgery is not final and may change up to and until the day of your surgery. You will be contacted 24-48 hours prior to your surgery date with your check-in and surgery time.    If you will not be at one of the below numbers please call the surgery scheduler at [phone]  Preferred Phone: [phone]    BEFORE YOUR SURGERY   Pre Registration and/or Lab Work must be done within and no earlier than 28 days prior to your surgery date. Please call [facility] at [phone] for an appointment as soon as possible.    DAY OF YOUR SURGERY  Nothing to eat or drink after midnight     Refrain from smoking any substance after midnight prior to surgery. Smoking may interfere with the anesthetic and frequently produces nausea during the recovery period.    Continue taking all lifesaving medications. Including the morning of your surgery with small sip of water.    Please arrive at the hospital/surgery center at the check-in time provided.     An adult will need to bring you and take you home after your surgery.     AFTER YOUR SURGERY  Post op Appointment:   Date: 2.21.23   Time: 10:00 AM    With: Geovanni Marcial M.D.   Location: [address] [place] [address]    - Post Surgery - You will need someone to drive you home  - Post Surgery - You will need someone to stay with you for 24 hours      TIME OFF WORK  FMLA or Disability forms can be faxed directly to: [phone] or you may drop them off at [address] [place] [address]. Our office charges a $35.00 fee per form. Forms will be completed within 10 business days of drop off and payment received. For the status of your forms you may contact our disability office directly [phone].    MEDICATION INSTRUCTIONS **Please read section  completely**    The following medications should be stopped a minimum of 10 days prior to surgery:  All over the counter, Supplements & Herbal medications    Anorectics: Phentermine (Adipex-P, Lomaira and Suprenza), Phentermine-topiramate (Qsymia), Bupropion-naltrexone (Contrave)    Opiod Partial Agonists/Opioid Antagonists: Buprenorphine (Subocone, Belbuca, Butrans, Probuphine Implant, Sublocade), Naltrexone (ReVia, Vivitrol), Naloxone    Amphetamines: Dextroamphetamine/Amphetamine (Adderall, Mydayis), Methylphenidate Hydrochloride (Concerta, Metadate, Methylin, Ritalin)    The following medications should be stopped 5 days prior to surgery:  Blood Thinners: Any Aspirin, Aspirin products, anti-inflammatories such as ibuprofen and any blood thinners such as Coumadin and Plavix. Please consult your prescribing physician if you are on life saving blood thinners, in regards to when to stop medications prior to surgery.     The following medications should be stopped a minimum of 3 days prior to surgery:  PDE-5 inhibitors: Sildenafil (Viagra), Tadalafil (Cialis), Vardenafil (Levitra), Avanafil (Stendra)    MAO Inhibitors: Rasagiline (Azilect), Selegiline (Eldepryl, Emsam, Selapar), Isocarboxazid (Marplan), Phenelzine (Nardil)         COVID and INFLUENZA NOTICE TO PATIENTS    Currently, the [facility] does not routinely test patients for COVID-19 or Influenza prior to their elective surgery.  However, if patients develop the following symptoms prior to their surgery date, they should voluntarily test for COVID-19 and Influenza and notify the surgical office of their condition and results.  The symptoms warranting testing would be any two of the following:    Fever (Temp above 100.4 F)  Chills  Cough  Shortness of breath or difficulty breathing  Fatigue  Myalgias (muscle or body aches)  Headache  Sore Throat  Congestion or Runny Nose  Nausea or vomiting  Diarrhea  New loss of taste or smell    Having  these symptoms prior to surgery can significantly increase your risk of morbidity and mortality under anesthesia, which may compromise your health and require a transfer to a hospital for a higher level of care.  Therefore, it is advisable to notify the surgical team of any illness in order to get information for the appropriate time to delay the surgery to minimize these preventable risks.    Your health and safety are our number one priority at the [facility], and we are thankful that you entrust us with your care.  Please help us ensure the best possible surgical and anesthetic outcome by sharing appropriate health information with our perioperative team and staff.  We look forward to taking great care of you!    Thank you for your time and consideration on this matter.    Ren Rincon MD  Medical Director  Anesthesiologist  [facility]

## 2023-02-07 NOTE — OR NURSING
Patient arrived to phase 2 alert and oriented x4. Vitals stable at this time. No complaints of nausea; patient rates her pain at a 3 at this time to right hip. Patient is able to transfer to chair with stand-by assistance. Discharge order in place. Patient changed into clothes form home without complication. Dressing is c/d/i to left groin. Discharge instructions given. Patient educated on pain medication. Questions answered. Patient and  state understanding of instructions. PIV removed, bleeding controlled, dressing placed. Patient discharged via wheelchair in stable condition .

## 2023-02-07 NOTE — ANESTHESIA TIME REPORT
Anesthesia Start and Stop Event Times     Date Time Event    2/7/2023 0649 Ready for Procedure     0703 Anesthesia Start     0826 Anesthesia Stop        Responsible Staff  02/07/23    Name Role Begin End    Korey Abreu M.D. Anesth 0703 0826        Overtime Reason:  no overtime (within assigned shift)    Comments:

## 2023-02-08 LAB — EKG IMPRESSION: NORMAL

## 2023-02-08 PROCEDURE — 93010 ELECTROCARDIOGRAM REPORT: CPT | Performed by: INTERNAL MEDICINE

## 2023-02-28 ENCOUNTER — OFFICE VISIT (OUTPATIENT)
Dept: MEDICAL GROUP | Facility: PHYSICIAN GROUP | Age: 53
End: 2023-02-28
Payer: COMMERCIAL

## 2023-02-28 VITALS
TEMPERATURE: 97 F | BODY MASS INDEX: 34 KG/M2 | HEIGHT: 60 IN | WEIGHT: 173.2 LBS | SYSTOLIC BLOOD PRESSURE: 140 MMHG | OXYGEN SATURATION: 95 % | DIASTOLIC BLOOD PRESSURE: 80 MMHG | HEART RATE: 66 BPM

## 2023-02-28 DIAGNOSIS — Z78.0 POST-MENOPAUSAL: ICD-10-CM

## 2023-02-28 DIAGNOSIS — Z87.81 HISTORY OF PELVIC FRACTURE: ICD-10-CM

## 2023-02-28 DIAGNOSIS — E78.5 DYSLIPIDEMIA: ICD-10-CM

## 2023-02-28 DIAGNOSIS — S32.811K: ICD-10-CM

## 2023-02-28 DIAGNOSIS — Z11.59 NEED FOR HEPATITIS C SCREENING TEST: ICD-10-CM

## 2023-02-28 DIAGNOSIS — M85.80 OSTEOPENIA WITH HIGH RISK OF FRACTURE: ICD-10-CM

## 2023-02-28 DIAGNOSIS — I10 PRIMARY HYPERTENSION: ICD-10-CM

## 2023-02-28 DIAGNOSIS — E66.9 OBESITY (BMI 30-39.9): ICD-10-CM

## 2023-02-28 PROCEDURE — 99214 OFFICE O/P EST MOD 30 MIN: CPT

## 2023-02-28 RX ORDER — ALENDRONATE SODIUM 70 MG/1
70 TABLET ORAL
Qty: 4 TABLET | Refills: 3 | Status: SHIPPED | OUTPATIENT
Start: 2023-02-28 | End: 2023-03-10 | Stop reason: SDUPTHER

## 2023-02-28 RX ORDER — AMOXICILLIN 500 MG/1
TABLET, FILM COATED ORAL
COMMUNITY
Start: 2023-02-22 | End: 2023-12-19

## 2023-02-28 RX ORDER — HYDROCHLOROTHIAZIDE 12.5 MG/1
12.5 CAPSULE, GELATIN COATED ORAL DAILY
Qty: 90 CAPSULE | Refills: 1 | Status: SHIPPED | OUTPATIENT
Start: 2023-02-28 | End: 2023-08-31 | Stop reason: SDUPTHER

## 2023-02-28 RX ORDER — ESCITALOPRAM OXALATE 10 MG/1
10 TABLET ORAL DAILY
Qty: 90 TABLET | Refills: 1 | Status: SHIPPED | OUTPATIENT
Start: 2023-02-28 | End: 2023-03-10 | Stop reason: SDUPTHER

## 2023-02-28 ASSESSMENT — ENCOUNTER SYMPTOMS
COUGH: 0
WEIGHT LOSS: 0
DIZZINESS: 0
FALLS: 0
DIARRHEA: 0
SHORTNESS OF BREATH: 0
CONSTIPATION: 0
WEAKNESS: 0
MYALGIAS: 0
VOMITING: 0
BACK PAIN: 0
ABDOMINAL PAIN: 0
BLURRED VISION: 0
CHILLS: 0
NAUSEA: 0
FEVER: 0
HEADACHES: 0

## 2023-02-28 ASSESSMENT — PATIENT HEALTH QUESTIONNAIRE - PHQ9: CLINICAL INTERPRETATION OF PHQ2 SCORE: 0

## 2023-02-28 NOTE — PROGRESS NOTES
Subjective:     CC: Osteopenia    HPI:   Gabriela presents today with    Problem   Osteopenia With High Risk of Fracture    Patient with osteopenia, with non-union fracture of pelvis after pinning.  This fracture occurred  In June as was unprovoked by any trauma. Her orthopedic doctor-Dr. Geovanni Marcial [facility] has sent her to have her put on medication for osteoporotic fracture.   -Feeling better but healing has been slow     Post-Menopausal    Chronic, reports vasomotor symptoms of hot flashes and mood swings for which she takes lexapro.  Doing well without side effects.     Multiple Fractures of Pelvis With Unstable Disruption of Pelvic Ring, Subsequent Encounter for Fracture With Nonunion    - Patient has history of unprovoked pelvic fracture in June 2023.  She is seeing Dr. Marcial at [facility] for this condition.  He is requesting she be started on agent such as Fosamax due to poor healing with nonunion.  Patient is already taking vitamin D and calcium supplementation.     Dyslipidemia    Chronic not taking medication for this condition at this time  The 10-year ASCVD risk score (Arnoldo ROMAN, et al., 2019) is: 2.7%  -Trying to eat healthy but is unable to exercise due to current healing pelvic fracture   Latest Reference Range & Units 12/24/22 08:49   Cholesterol,Tot 100 - 199 mg/dL 218 (H)   Triglycerides 0 - 149 mg/dL 98   HDL >=40 mg/dL 72   LDL <100 mg/dL 126 (H)        Primary Hypertension    Chronic condition stable with blood pressure today in clinic at 140/80.  Patient taking olmesartan 20 mg daily and doing well on this medication without reported side effects.  She denies dizziness lightheadedness headache or chest pain today     Obesity (Bmi 30-39.9)    Chronic condition-  -currently is 173 pounds and BMI is 33.44 which has increased since August in which weight was 160 pounds and patient's BMI was 31%.  -Unfortunately patient is unable to exercise due to pelvic fracture  -She has been indulging over the holidays           Health Maintenance: Recommend follow-up for well woman visit with cervical cancer screening surveillance, hep B vaccine, COVID-vaccine, Tdap vaccine, shingles vaccine, and influenza vaccine    ROS:  Review of Systems   Constitutional:  Negative for chills, fever, malaise/fatigue and weight loss.   Eyes:  Negative for blurred vision.   Respiratory:  Negative for cough and shortness of breath.    Cardiovascular:  Negative for chest pain.   Gastrointestinal:  Negative for abdominal pain, constipation, diarrhea, nausea and vomiting.   Musculoskeletal:  Positive for joint pain. Negative for back pain, falls and myalgias.   Neurological:  Negative for dizziness, weakness and headaches.     Objective:     Exam:  BP (!) 140/80 (BP Location: Left arm, Patient Position: Sitting, BP Cuff Size: Adult)   Pulse 66   Temp 36.1 °C (97 °F) (Temporal)   Ht 1.524 m (5')   Wt 78.6 kg (173 lb 3.2 oz)   SpO2 95%   BMI 33.83 kg/m²  Body mass index is 33.83 kg/m².    Physical Exam  Constitutional:       General: She is not in acute distress.     Appearance: Normal appearance. She is not ill-appearing or toxic-appearing.   HENT:      Head: Normocephalic.   Eyes:      Conjunctiva/sclera: Conjunctivae normal.   Pulmonary:      Effort: Pulmonary effort is normal.   Skin:     General: Skin is warm and dry.   Neurological:      General: No focal deficit present.      Mental Status: She is alert and oriented to person, place, and time.   Psychiatric:         Mood and Affect: Mood normal.         Behavior: Behavior normal.       Labs:   Admission on 2023, Discharged on 2023   Component Date Value    Report 2023                      [facility]    Test Date:  2023  Pt Name:    NADIRA ERNANDEZ                Department: [facility]  MRN:        [id number]                      Room:       [facility]  Gender:     Female                       Technician: GHAZAL  :        1970                   Requested  By:CURT JCARLOS LLUVIA  Order #:    [id number]                    Reading MD: Faisal Meade MD    Measurements  Intervals                                Axis  Rate:       78                           P:          48  AL:         141                          QRS:        15  QRSD:       85                           T:          -15  QT:         434  QTc:        495    Interpretive Statements  Sinus rhythm  Borderline T abnormalities, inferior leads  Borderline prolonged QT interval  Compared to ECG 06/27/2022 17:07:30  T-wave abnormality now present  Electronically Signed On 2-8-2023 2:05:28 PST by Faisal Meade MD     Pre-Admission Testing on 02/01/2023   Component Date Value    Sodium 02/01/2023 140     Potassium 02/01/2023 4.0     Chloride 02/01/2023 103     Co2 02/01/2023 19 (L)     Glucose 02/01/2023 84     Bun 02/01/2023 22     Creatinine 02/01/2023 1.00     Calcium 02/01/2023 10.2     Anion Gap 02/01/2023 18.0 (H)     GFR (CKD-EPI) 02/01/2023 67          Assessment & Plan: Medical Decision Making     53 y.o. female with the following -     Problem List Items Addressed This Visit       Primary hypertension     - Condition uncontrolled we will add additional agent to patient's routine.  Hydrochlorothiazide 12.5 mg daily will be added agent is ideal secondary to her decreased bone mineral density as it helps to preserve the body's serum calcium.  -Patient will also continue olmesartan 20 mg daily  -Recommend home blood pressure monitoring as discussed         Relevant Medications    hydrochlorothiazide (MICROZIDE) 12.5 MG capsule    Obesity (BMI 30-39.9)     Chronic condition uncontrolled  -Recommend diligent efforts towards healthy diet and exercise  -Handout given for Mediterranean style diet  -Recommend getting at least 30 minutes of activity most days of the week if tolerated such as light walking upper body resistance training with hand weights.         History of pelvic fracture    Relevant Medications     hydrochlorothiazide (MICROZIDE) 12.5 MG capsule    Dyslipidemia     Chronic uncontrolled  -Recommend diligent efforts towards healthy diet and exercise as tolerated         Multiple fractures of pelvis with unstable disruption of pelvic ring, subsequent encounter for fracture with nonunion     Chronic, non-healing  -Continue to follow up with Dr. Marcial for management of this condition  -Fosamax has been initiated as directed by Orthopedist   -Continue vitamin D at least 2000 IUs/day and calcium at least 1200 mg/day  -Weightbearing exercise as discussed         Relevant Medications    alendronate (FOSAMAX) 70 MG Tab    Osteopenia with high risk of fracture     - Chronic condition uncontrolled  -Recommend patient take vitamin D3 2000 IUs daily along with calcium citrate 1200 mg daily.  In addition we will add Fosamax 70 mg weekly.  Risk and side effects of this medication have been discussed.  Patient does not have any history of GERD.         Relevant Medications    alendronate (FOSAMAX) 70 MG Tab    hydrochlorothiazide (MICROZIDE) 12.5 MG capsule    Post-menopausal     - Chronic stable condition  -Refill sent for Lexapro 10 mg daily         Relevant Medications    escitalopram (LEXAPRO) 10 MG Tab     Other Visit Diagnoses       Need for hepatitis C screening test        Relevant Orders    HCV Scrn ( [id number] [employer]Life)            Differential diagnosis, natural history, supportive care, and indications for immediate follow-up discussed.  Shared decision making approach utilized, and patient is amendable with plan of care.  Patient understands to return to clinic or go to the emergency department if symptoms worsen. All questions and concerns addressed to the best of my knowledge.    Return in about 4 months (around 2023).    Please note that this dictation was created using voice recognition software. I have made every reasonable attempt to correct obvious errors, but I expect that there are errors of  grammar and possibly content that I did not discover before finalizing the note.

## 2023-02-28 NOTE — ASSESSMENT & PLAN NOTE
- Chronic condition uncontrolled  -Recommend patient take vitamin D3 2000 IUs daily along with calcium citrate 1200 mg daily.  In addition we will add Fosamax 70 mg weekly.  Risk and side effects of this medication have been discussed.  Patient does not have any history of GERD.

## 2023-02-28 NOTE — ASSESSMENT & PLAN NOTE
- Condition uncontrolled we will add additional agent to patient's routine.  Hydrochlorothiazide 12.5 mg daily will be added agent is ideal secondary to her decreased bone mineral density as it helps to preserve the body's serum calcium.  -Patient will also continue olmesartan 20 mg daily  -Recommend home blood pressure monitoring as discussed

## 2023-02-28 NOTE — ASSESSMENT & PLAN NOTE
Chronic condition uncontrolled  -Recommend diligent efforts towards healthy diet and exercise  -Handout given for Mediterranean style diet  -Recommend getting at least 30 minutes of activity most days of the week if tolerated such as light walking upper body resistance training with hand weights.

## 2023-02-28 NOTE — ASSESSMENT & PLAN NOTE
Chronic, non-healing  -Continue to follow up with Dr. Marcial for management of this condition  -Fosamax has been initiated as directed by Orthopedist   -Continue vitamin D at least 2000 IUs/day and calcium at least 1200 mg/day  -Weightbearing exercise as discussed

## 2023-03-10 DIAGNOSIS — M85.80 OSTEOPENIA WITH HIGH RISK OF FRACTURE: ICD-10-CM

## 2023-03-10 DIAGNOSIS — S32.811K: ICD-10-CM

## 2023-03-10 DIAGNOSIS — Z78.0 POST-MENOPAUSAL: ICD-10-CM

## 2023-03-10 DIAGNOSIS — Z87.81 HISTORY OF PELVIC FRACTURE: ICD-10-CM

## 2023-03-10 RX ORDER — ALENDRONATE SODIUM 70 MG/1
70 TABLET ORAL
Qty: 4 TABLET | Refills: 3 | Status: SHIPPED | OUTPATIENT
Start: 2023-03-10 | End: 2023-08-31 | Stop reason: SDUPTHER

## 2023-03-10 RX ORDER — ESCITALOPRAM OXALATE 10 MG/1
10 TABLET ORAL DAILY
Qty: 90 TABLET | Refills: 1 | Status: SHIPPED | OUTPATIENT
Start: 2023-03-10 | End: 2023-07-18 | Stop reason: SDUPTHER

## 2023-03-10 RX ORDER — IBUPROFEN 800 MG/1
800 TABLET ORAL EVERY 8 HOURS PRN
Qty: 30 TABLET | Refills: 0 | Status: SHIPPED | OUTPATIENT
Start: 2023-03-10 | End: 2023-04-11 | Stop reason: SDUPTHER

## 2023-04-11 DIAGNOSIS — Z87.81 HISTORY OF PELVIC FRACTURE: ICD-10-CM

## 2023-04-11 DIAGNOSIS — Z78.0 POST-MENOPAUSAL: ICD-10-CM

## 2023-04-12 RX ORDER — IBUPROFEN 800 MG/1
800 TABLET ORAL EVERY 8 HOURS PRN
Qty: 30 TABLET | Refills: 0 | Status: SHIPPED | OUTPATIENT
Start: 2023-04-12 | End: 2023-07-18 | Stop reason: SDUPTHER

## 2023-05-23 ENCOUNTER — HOSPITAL ENCOUNTER (OUTPATIENT)
Dept: RADIOLOGY | Facility: MEDICAL CENTER | Age: 53
End: 2023-05-23
Attending: FAMILY MEDICINE
Payer: COMMERCIAL

## 2023-05-23 ENCOUNTER — OCCUPATIONAL MEDICINE (OUTPATIENT)
Dept: URGENT CARE | Facility: PHYSICIAN GROUP | Age: 53
End: 2023-05-23
Payer: COMMERCIAL

## 2023-05-23 VITALS
TEMPERATURE: 97 F | SYSTOLIC BLOOD PRESSURE: 124 MMHG | WEIGHT: 156 LBS | DIASTOLIC BLOOD PRESSURE: 72 MMHG | RESPIRATION RATE: 16 BRPM | HEIGHT: 60 IN | HEART RATE: 72 BPM | OXYGEN SATURATION: 97 % | BODY MASS INDEX: 30.63 KG/M2

## 2023-05-23 DIAGNOSIS — S99.912A INJURY OF LEFT ANKLE, INITIAL ENCOUNTER: ICD-10-CM

## 2023-05-23 DIAGNOSIS — S93.402A INVERSION SPRAIN OF LEFT ANKLE, INITIAL ENCOUNTER: ICD-10-CM

## 2023-05-23 DIAGNOSIS — S06.0X0A CONCUSSION WITHOUT LOSS OF CONSCIOUSNESS, INITIAL ENCOUNTER: ICD-10-CM

## 2023-05-23 PROCEDURE — 99213 OFFICE O/P EST LOW 20 MIN: CPT | Performed by: FAMILY MEDICINE

## 2023-05-23 PROCEDURE — 3078F DIAST BP <80 MM HG: CPT | Performed by: FAMILY MEDICINE

## 2023-05-23 PROCEDURE — 73630 X-RAY EXAM OF FOOT: CPT | Mod: LT

## 2023-05-23 PROCEDURE — 73610 X-RAY EXAM OF ANKLE: CPT | Mod: LT

## 2023-05-23 PROCEDURE — 3074F SYST BP LT 130 MM HG: CPT | Performed by: FAMILY MEDICINE

## 2023-05-23 ASSESSMENT — ENCOUNTER SYMPTOMS
FEVER: 0
HEADACHES: 1
NAUSEA: 1
DIZZINESS: 1

## 2023-05-23 NOTE — PROGRESS NOTES
Subjective:     Gabriela Carrillo is a 53 y.o. female who presents for Ankle Injury (Rolled L ankle off of a step/Throbbing pain constantly/Swelling and slight bruising present/Onset 4 hours) and Head Injury (Hit L side of head behind ear on metal box/Small lac behind L ear)    HPI  Pt presents for evaluation of an acute problem  DOI: 5/23/2023  BRAYAN: Rolled ankle off a step at work causing injury to left ankle.  During the fall, hit left side of head on a metal box   Remembers the injury, no LOC   Pain in ankle is maximal along the lateral ankle   Has difficulties bearing full weight   No numbness or tingling   Having new headaches since the injury   Has a small laceration just behind left ear  Has some nausea and some dizziness  Has not taken any ibuprofen    SCAT-5  Symptom eval  Headache 4  “Pressure in head” 4  Neck Pain 4  Nausea or vomiting 2  Dizziness 2  Blurred vision 2  Balance problems 2  Sensitivity to light 0  Sensitivity to noise 0   Feeling slowed down 0   Feeling like “in a fog“ 1  “Don’t feel right” 2  Difficulty concentrating 2   Difficulty remembering 0  Fatigue or low energy 0  Confusion 0  Drowsiness 0   More emotional 0  Irritability 0  Sadness 0  Nervous or Anxious 0   Trouble falling asleep 0     Total number of symptoms (out of 22) - 10  Score (out of 132) - 25    Review of Systems   Constitutional:  Negative for fever.   Gastrointestinal:  Positive for nausea.   Musculoskeletal:  Positive for joint pain.   Neurological:  Positive for dizziness and headaches.       PMH: Past medical history reviewed in Epic  MEDS: Medications were reviewed in Epic  ALLERGIES: Allergies were reviewed in Epic     Objective:   /72 (BP Location: Right arm, Patient Position: Sitting, BP Cuff Size: Adult long)   Pulse 72   Temp 36.1 °C (97 °F) (Temporal)   Resp 16   Ht 1.524 m (5')   Wt 70.8 kg (156 lb)   SpO2 97%   BMI 30.47 kg/m²     Physical Exam  Constitutional:       General: She is not in  acute distress.     Appearance: She is well-developed. She is not diaphoretic.   Pulmonary:      Effort: Pulmonary effort is normal.   Neurological:      Mental Status: She is alert and oriented to person, place, and time.      Cranial Nerves: No cranial nerve deficit.      Sensory: No sensory deficit.   Psychiatric:         Mood and Affect: Mood normal.         Behavior: Behavior normal.         Thought Content: Thought content normal.         Judgment: Judgment normal.     Left ankle/foot:  Appearance: Moderate swelling throughout lateral ankle and foot  Palpation: +TTP maximally at the lateral malleolus with tenderness throughout the lateral ankle, dorsal foot, and mildly along the medial ankle  ROM: Limited by pain   Special testing: Neg squeeze test, no pain/click with Karolyn's   Neurovascular: 2+ dorsalis pedis and posterior tibial  Sensation intact and equal bilaterally  Gait: Antalgic     Assessment/Plan:   Assessment    1. Injury of left ankle, initial encounter  - DX-ANKLE 3+ VIEWS LEFT; Future  - DX-FOOT-COMPLETE 3+ LEFT; Future  - Referral to Occupational Medicine    2. Inversion sprain of left ankle, initial encounter  - DX-ANKLE 3+ VIEWS LEFT; Future  - DX-FOOT-COMPLETE 3+ LEFT; Future  - Referral to Occupational Medicine    3. Concussion without loss of consciousness, initial encounter  - Referral to Occupational Medicine    Patient with left ankle sprain.  X-ray does not show fracture.  Having great difficulty ambulating with ankle brace and patient was placed in a walking boot.      Patient also sustained a concussion.  Recommended taking the next 2 days off of work and may return after that with restrictions.  Advised to avoid all activities which cause headache or exacerbate concussion symptoms.    Transfer care to occupational medicine for further follow-up.

## 2023-05-23 NOTE — LETTER
EMPLOYEE’S CLAIM FOR COMPENSATION/ REPORT OF INITIAL TREATMENT  FORM C-4  PLEASE TYPE OR PRINT    EMPLOYEE’S CLAIM - PROVIDE ALL INFORMATION REQUESTED   First Name  Gabriela Last Name  Gerardo Birthdate                    1970                Sex  female Claim Number (Insurer’s Use Only)   Home Address  [address] Age  53 y.o. 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 (if applicable)  Inside machine w/robot replacing part    How did this injury or occupational disease occur? (Be specific and answer in detail. Use additional sheet if necessary)  Rolled ankle off step, also hit head   If you believe that you have an occupational disease, when did you first have knowledge of the disability and its relationship to your employment?  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  I HEREBY AUTHORIZE ANY PHYSICIAN, CHIROPRACTOR, SURGEON, PRACTITIONER OR ANY OTHER PERSON, ANY HOSPITAL, INCLUDING [facility] OR [facility], ANY  MEDICAL SERVICE ORGANIZATION, ANY INSURANCE COMPANY, OR OTHER INSTITUTION OR ORGANIZATION TO RELEASE TO EACH OTHER, ANY MEDICAL OR OTHER INFORMATION, INCLUDING BENEFITS PAID OR PAYABLE, PERTINENT TO THIS INJURY OR DISEASE, EXCEPT INFORMATION RELATIVE TO DIAGNOSIS, TREATMENT AND/OR COUNSELING FOR AIDS, PSYCHOLOGICAL CONDITIONS, ALCOHOL OR CONTROLLED SUBSTANCES, FOR WHICH I MUST GIVE SPECIFIC AUTHORIZATION.  A PHOTOSTAT OF THIS AUTHORIZATION SHALL BE VALID AS THE ORIGINAL.       Date [facility] Employee’s Original or  *Electronic Signature   THIS REPORT MUST BE COMPLETED AND MAILED WITHIN 3 WORKING DAYS OF TREATMENT   Place  [facility]  Name of Facility  [facility]   Date  5/23/2023 Diagnosis and Description of Injury or Occupational Disease  (S99.912A) Injury of left ankle, initial encounter  (S93.402A) Inversion sprain of left ankle, initial encounter  (S06.0X0A) Concussion without loss of consciousness, initial encounter Is there evidence that the injured employee was under the influence of alcohol and/or another controlled substance at the time of accident?  ? No ? Yes (if yes, please explain)   Hour  11:47 AM   Diagnoses of Injury of left ankle, initial encounter, Inversion sprain of left ankle, initial encounter, and Concussion without loss of consciousness, initial encounter were pertinent to this visit. No   Treatment  Walking boot, NSAIDs as needed, avoidance of any activities which cause headache  Have you advised the patient to remain off work five days or     more?    X-Ray Findings  Negative   ? Yes Indicate dates:   From   To      From information given by the  employee, together with medical evidence, can        you directly connect this injury or occupational disease as job incurred?  Yes ? No If no, is the injured employee capable of:  ? full duty  No ? modified duty  Yes   Is additional medical care by a physician indicated?  Yes If modified duty, specify any limitations / restrictions  Recommend being off work for the next 2 days   Starting 5/26/2023, patient may return to work on a limited basis.  Must wear walking boot while at work.  Should perform seated work only.  Should stop any work being done and rest if she has moderate headache.     Do you know of any previous injury or disease contributing to this condition or occupational disease?  ? Yes ? No (Explain if yes)                          No   Date  5/23/2023 Print Health Care Provider's  Name  Ralph Yoder M.D. I certify that the employer’s copy of  this form was delivered to the employer on:   Address  [address]  [facility] [address] [facility] Insurer’s Use Only     [facility]  [address]    Provider’s Tax ID Number  [id number] Telephone  Dept: [phone]             Health Care Provider’s Original or Electronic Signature  e-RALPH Cruz M.D. Degree (MD,DO, DC,PA-C,APRN)        * Complete and attach Release of Information (Form C-4A) when injured employee signs C-4 Form electronically  ORIGINAL - TREATING HEALTHCARE PROVIDER PAGE 2 - INSURER/TPA PAGE 3 - EMPLOYER PAGE 4 - EMPLOYEE             Form C-4 (rev.08/21)           BRIEF DESCRIPTION OF RIGHTS AND BENEFITS  (Pursuant to [place] 616C.050)    Notice of Injury or Occupational Disease (Incident Report Form C-1): If an injury or occupational disease (OD) arises out of and in the course of employment, you must provide written notice to your employer as soon as practicable, but no later than 7 days after the accident or OD. Your employer shall maintain a sufficient supply of the required forms.    Claim for Compensation (Form C-4): If medical  "treatment is sought, the form C-4 is available at the place of initial treatment. A completed \"Claim for Compensation\" (Form C-4) must be filed within 90 days after an accident or OD. The treating physician or chiropractor must, within 3 working days after treatment, complete and mail to the employer, the employer's insurer and third-party , the Claim for Compensation.    Medical Treatment: If you require medical treatment for your on-the-job injury or OD, you may be required to select a physician or chiropractor from a list provided by your workers’ compensation insurer, if it has contracted with an Organization for Managed Care (MCO) or Preferred Provider Organization (PPO) or providers of health care. If your employer has not entered into a contract with an MCO or PPO, you may select a physician or chiropractor from the Panel of Physicians and Chiropractors. Any medical costs related to your industrial injury or OD will be paid by your insurer.    Temporary Total Disability (TTD): If your doctor has certified that you are unable to work for a period of at least 5 consecutive days, or 5 cumulative days in a 20-day period, or places restrictions on you that your employer does not accommodate, you may be entitled to TTD compensation.    Temporary Partial Disability (TPD): If the wage you receive upon reemployment is less than the compensation for TTD to which you are entitled, the insurer may be required to pay you TPD compensation to make up the difference. TPD can only be paid for a maximum of 24 months.    Permanent Partial Disability (PPD): When your medical condition is stable and there is an indication of a PPD as a result of your injury or OD, within 30 days, your insurer must arrange for an evaluation by a rating physician or chiropractor to determine the degree of your PPD. The amount of your PPD award depends on the date of injury, the results of the PPD evaluation, your age and " wage.    Permanent Total Disability (PTD): If you are medically certified by a treating physician or chiropractor as permanently and totally disabled and have been granted a PTD status by your insurer, you are entitled to receive monthly benefits not to exceed 66 2/3% of your average monthly wage. The amount of your PTD payments is subject to reduction if you previously received a lump-sum PPD award.    Vocational Rehabilitation Services: You may be eligible for vocational rehabilitation services if you are unable to return to the job due to a permanent physical impairment or permanent restrictions as a result of your injury or occupational disease.    Transportation and Per Laura Reimbursement: You may be eligible for travel expenses and per laura associated with medical treatment.    Reopening: You may be able to reopen your claim if your condition worsens after claim closure.     Appeal Process: If you disagree with a written determination issued by the insurer or the insurer does not respond to your request, you may appeal to the Department of Administration, , by following the instructions contained in your determination letter. You must appeal the determination within 70 days from the date of the determination letter at [address], [address], [place], [place] [address], or [address], [place] [address]. If you disagree with the  decision, you may appeal to the Department of Administration, . You must file your appeal within 30 days from the date of the  decision letter at [address], [address], [place] [address], or [address] [facility] [facility] [facility] [address], [place] [address]. If you disagree with a decision of an , you may file a petition for judicial review with the District Court. You must do so within 30 days of the Appeal Officer’s decision. You may be represented by an   at your own expense or you may contact the NA for possible representation.    [place]  for Injured Workers (NAIW): If you disagree with a  decision, you may request that NAIW represent you without charge at an  Hearing. For information regarding denial of benefits, you may contact the NA at: [address] [facility], [address], [place] [address], [phone], or [address] [facility] [address] [facility], [address], [place] [address], [phone]    To File a Complaint with the Division: If you wish to file a complaint with the  of the Division of Industrial Relations (DIR),  please contact the Workers’ Compensation Section, [address], [address], [place], [place] [address], telephone (751) [phone], or [address], [address], [place], [place] [address], telephone [phone].    For assistance with Workers’ Compensation Issues: You may contact the [facility] Office for Consumer Health Assistance, [address], [address] [facility] [address], [place], [place] [address], Toll Free 1-766.649.2457, Web site: http://[facility].[place].gov/Programs/ANITRA E-mail: anitra@[facility].[email or web address].[facility]              __________________________________________________________________                                    _________________            Employee Name / Signature                                                                                                                            Date                                                                                                                                                                                                                              D-2 (rev. 10/20)

## 2023-05-23 NOTE — LETTER
"   [facility] [address] [place] [address]  Phone:  [phone] - Fax:  [phone]   [facility] Progress Report and Disability Certification  Date of Service: 5/23/2023   No Show:  No  Date / Time of Next Visit: 5/30/2023 @10:30 AM   Claim Information   Patient Name: Gabriela Carrillo  Claim Number:     Employer: [employer]  Date of Injury: 5/23/2023     Insurer / TPA: [employer]  ID / SSN:     Occupation: Production [employer]  Diagnosis: Diagnoses of Injury of left ankle, initial encounter, Inversion sprain of left ankle, initial encounter, and Concussion without loss of consciousness, initial encounter were pertinent to this visit.    Medical Information   Related to Industrial Injury? Yes    Subjective Complaints:  Pt presents for evaluation of an acute problem  DOI: 5/23/2023  BRAYAN: Rolled ankle off a step at work causing injury to left ankle.  During the fall, hit left side of head on a metal box   Remembers the injury, no LOC   Pain in ankle is maximal along the lateral ankle   Has difficulties bearing full weight   No numbness or tingling   Having new headaches since the injury   Has a small laceration just behind left ear  Has some nausea and some dizziness  Has not taken any ibuprofen    SCAT-5  Symptom eval  Headache 4  \"Pressure in head\" 4  Neck Pain 4  Nausea or vomiting 2  Dizziness 2  Blurred vision 2  Balance problems 2  Sensitivity to light 0  Sensitivity to noise 0   Feeling slowed down 0   Feeling like \"in a fog\" 1  \"Don't feel right\" 2  Difficulty concentrating 2   Difficulty remembering 0  Fatigue or low energy 0  Confusion 0  Drowsiness 0   More emotional 0  Irritability 0  Sadness 0  Nervous or Anxious 0   Trouble falling asleep 0     Total number of symptoms (out of 22) - 10  Score (out of 132) - 25   Objective Findings: Neurological:      Mental Status: She is alert and oriented to person, place, and time.      Cranial " Nerves: No cranial nerve deficit.      Sensory: No sensory deficit.   Psychiatric:         Mood and Affect: Mood normal.         Behavior: Behavior normal.         Thought Content: Thought content normal.         Judgment: Judgment normal.     Left ankle/foot:  Appearance: Moderate c swelling throughout lateral ankle and foot  Palpation: +TTP maximally at the lateral malleolus with tenderness throughout the lateral ankle, dorsal foot, and mildly along the medial ankle  ROM: Limited by pain   Special testing: Neg squeeze test, no pain/click with Karolyn's   Neurovascular: 2+ dorsalis pedis and posterior tibial  Sensation intact and equal bilaterally  Gait: Antalgic    Pre-Existing Condition(s):     Assessment:   Initial Visit    Status: Additional Care Required  Permanent Disability:No    Plan: Transfer Care    Diagnostics:      Comments:       Disability Information   Status: Released to Restricted Duty    From:  5/23/2023  Through: 5/30/2023 Restrictions are: Temporary   Physical Restrictions   Sitting:    Standing:  < or = to 1 hr/day Stooping:    Bending:      Squatting:    Walking:  < or = to 1 hr/day Climbing:    Pushing:      Pulling:    Other:    Reaching Above Shoulder (L):   Reaching Above Shoulder (R):       Reaching Below Shoulder (L):    Reaching Below Shoulder (R):      Not to exceed Weight Limits   Carrying(hrs):   Weight Limit(lb): < or = to 10 pounds Lifting(hrs):   Weight  Limit(lb): < or = to 10 pounds   Comments: Recommend being off work for the next 2 days  Starting 5/26/2023, patient may return to work on a limited basis.  Must wear walking boot while at work.  Should perform seated work only.  Should stop any work being done and rest if she has moderate headache.    Repetitive Actions   Hands: i.e. Fine Manipulations from Grasping:     Feet: i.e. Operating Foot Controls:     Driving / Operate Machinery:     Health Care Provider’s Original or Electronic Signature  Edwardo Yoder M.D. Health  Care Provider’s Original or Electronic Signature    Derek Gutiérrez DO MPH     Clinic Name / Location: [address] [address] Clinic Phone Number: Dept: [phone]   Appointment Time: 11:35 Am Visit Start Time: 11:47 AM   Check-In Time:  11:42 Am Visit Discharge Time:  1:25 PM   Original-Treating Physician or Chiropractor    Page 2-Insurer/TPA    Page 3-Employer    Page 4-Employee

## 2023-05-30 ENCOUNTER — OCCUPATIONAL MEDICINE (OUTPATIENT)
Dept: URGENT CARE | Facility: PHYSICIAN GROUP | Age: 53
End: 2023-05-30
Payer: COMMERCIAL

## 2023-05-30 VITALS
DIASTOLIC BLOOD PRESSURE: 70 MMHG | SYSTOLIC BLOOD PRESSURE: 118 MMHG | RESPIRATION RATE: 12 BRPM | TEMPERATURE: 98.5 F | WEIGHT: 156 LBS | BODY MASS INDEX: 30.63 KG/M2 | OXYGEN SATURATION: 98 % | HEIGHT: 60 IN | HEART RATE: 67 BPM

## 2023-05-30 DIAGNOSIS — S99.912D INJURY OF LEFT ANKLE, SUBSEQUENT ENCOUNTER: ICD-10-CM

## 2023-05-30 DIAGNOSIS — S06.0X0D CONCUSSION WITHOUT LOSS OF CONSCIOUSNESS, SUBSEQUENT ENCOUNTER: ICD-10-CM

## 2023-05-30 DIAGNOSIS — Z02.6 ENCOUNTER RELATED TO WORKER'S COMPENSATION CLAIM: ICD-10-CM

## 2023-05-30 PROCEDURE — 99213 OFFICE O/P EST LOW 20 MIN: CPT | Performed by: PHYSICIAN ASSISTANT

## 2023-05-30 PROCEDURE — 3078F DIAST BP <80 MM HG: CPT | Performed by: PHYSICIAN ASSISTANT

## 2023-05-30 PROCEDURE — 3074F SYST BP LT 130 MM HG: CPT | Performed by: PHYSICIAN ASSISTANT

## 2023-05-30 NOTE — LETTER
[facility] [address] [address]  Phone:  [phone] - Fax:  [phone]   [facility] Progress Report and Disability Certification  Date of Service: 5/30/2023   No Show:  No  Date / Time of Next Visit: 6/5/2023 (OCCUPATIONAL MED)   Claim Information   Patient Name: Gabriela Carrillo  Claim Number:     Employer: [employer]  Date of Injury: 5/23/2023     Insurer / TPA: [employer]  ID / SSN:     Occupation: [employer]  Diagnosis: Diagnoses of Injury of left ankle, subsequent encounter, Concussion without loss of consciousness, subsequent encounter, and Encounter related to worker's compensation claim were pertinent to this visit.    Medical Information   Related to Industrial Injury? Yes    Subjective Complaints:  Copied from initial visit:   Pt presents for evaluation of an acute problem  DOI: 5/23/2023  BRAYAN: Rolled ankle off a step at work causing injury to left ankle.  During the fall, hit left side of head on a metal box   Remembers the injury, no LOC   Pain in ankle is maximal along the lateral ankle   Has difficulties bearing full weight   No numbness or tingling   Having new headaches since the injury   Has a small laceration just behind left ear  Has some nausea and some dizziness  Has not taken any ibuprofen    Update 5/30/2023: Remains in boot.  Persistent severe soft tissue swelling and bruising.  Unable to weight-bear for the most part.  Has been icing elevating and using NSAIDs/Tylenol.  Some persistent headaches.  No nausea or vomiting.  No speech or vision changes.  No unilateral weakness.  Follow-up was supposed to have been made with occupational medicine.   Objective Findings: Left ankle/foot:  Appearance: Moderate swelling throughout lateral ankle and foot  Palpation: +TTP maximally at the lateral malleolus with tenderness throughout the lateral ankle, dorsal foot, and mildly along the medial ankle  ROM:  Limited by pain   Special testing: Neg squeeze test, no pain/click with Karolyn's   Neurovascular: 2+ dorsalis pedis and posterior tibial  Sensation intact and equal bilaterally  Gait: Antalgic    Cranial nerves II to XII grossly intact  No pronator drift  Pupils equal round reactive to light  EOM intact   Pre-Existing Condition(s):     Assessment:   Condition Same    Status: Discharged / Care Transfer  Comments:Transfer care to occupational medicine  Permanent Disability:No    Plan: Medication  Comments:Continue NSAIDs/Tylenol as needed for pain    Diagnostics:      Comments:    RADIOLOGY RESULTS  DX-FOOT-COMPLETE 3+ LEFT    Result Date: 5/23/2023 5/23/2023 12:21 PM HISTORY/REASON FOR EXAM:  Pain/Deformity Following Trauma TECHNIQUE/EXAM DESCRIPTION AND NUMBER OF VIEWS: 3 views of the LEFT foot. COMPARISON:  5/23/2023. FINDINGS: No acute fracture or dislocation. No joint osteoarthritis.     No acute osseous abnormality.    DX-ANKLE 3+ VIEWS LEFT    Result Date: 5/23/2023 5/23/2023 12:20 PM HISTORY/REASON FOR EXAM:  Pain/Deformity Following Trauma rolled left ankle this morning, pain/bruising to lateral aspect of left ankle TECHNIQUE/EXAM DESCRIPTION AND NUMBER OF VIEWS:  3 views of the LEFT ankle. COMPARISON: None. FINDINGS: No acute fracture or dislocation. The ankle mortise is intact. Soft tissue swelling about the lateral malleolus. No joint arthropathy.     Soft tissue swelling about the lateral malleolus. No acute osseous abnormality.    DX-PELVIS-TRAUMA SERIES  3-    Result Date: 5/11/2023  Plain film x-ray taken today independently reviewed by myself include AP inlet outlet views pelvis demonstrating healing right superior pubic rami fixation with hardware in good position and intact.              Disability Information   Status: Released to Restricted Duty    From:  5/30/2023  Through: 6/5/2023 Restrictions are: Temporary   Physical Restrictions   Sitting:    Standing:    Stooping:    Bending:       Squatting:    Walking:    Climbing:    Pushing:      Pulling:    Other:    Reaching Above Shoulder (L):   Reaching Above Shoulder (R):       Reaching Below Shoulder (L):    Reaching Below Shoulder (R):      Not to exceed Weight Limits   Carrying(hrs):   Weight Limit(lb):   Lifting(hrs):   Weight  Limit(lb):     Comments: Must wear walking boot while at work.  Should perform seated work only.  Should stop any work being done and rest if she has moderate headache.  Transfer of care to occupational medicine as per previous D39, Future appointments should be with occupational medicine  Continue to ice, elevate, NSAIDs/Tylenol as needed for pain and swelling to left lower extremity  Range of motion exercises of left ankle discussed  Discussed principles of rehab and transition to weightbearing as tolerated    Repetitive Actions   Hands: i.e. Fine Manipulations from Grasping:     Feet: i.e. Operating Foot Controls:     Driving / Operate Machinery:     Health Care Provider’s Original or Electronic Signature  Asia Abrams P.A.-C. Health Care Provider’s Original or Electronic Signature    Derek Gutiérrez DO MPH     Clinic Name / Location: [address] [address] Clinic Phone Number: Dept: [phone]   Appointment Time: 10:30 Am Visit Start Time: 10:46 AM   Check-In Time:  10:40 Am Visit Discharge Time:  11:31 AM   Original-Treating Physician or Chiropractor    Page 2-Insurer/TPA    Page 3-Employer    Page 4-Employee

## 2023-05-30 NOTE — PROGRESS NOTES
Subjective     Gabriela Carrillo is a 53 y.o. female who presents with Follow-Up (L ankle injury, pt states it is still painful and not improving)      Copied from initial visit:   Pt presents for evaluation of an acute problem  DOI: 5/23/2023  BRAYAN: Rolled ankle off a step at work causing injury to left ankle.  During the fall, hit left side of head on a metal box   Remembers the injury, no LOC   Pain in ankle is maximal along the lateral ankle   Has difficulties bearing full weight   No numbness or tingling   Having new headaches since the injury   Has a small laceration just behind left ear  Has some nausea and some dizziness  Has not taken any ibuprofen    Update 5/30/2023: Remains in boot.  Persistent severe soft tissue swelling and bruising.  Unable to weight-bear for the most part.  Has been icing elevating and using NSAIDs/Tylenol.  Some persistent headaches.  No nausea or vomiting.  No speech or vision changes.  No unilateral weakness.  Follow-up was supposed to have been made with occupational medicine.                Objective   You are making these appointments on the use of reporting the monitor calendar  /70   Pulse 67   Temp 36.9 °C (98.5 °F)   Resp 12   Ht 1.524 m (5')   Wt 70.8 kg (156 lb)   SpO2 98%   BMI 30.47 kg/m²      Physical Exam    Left ankle/foot:  Appearance: Moderate swelling throughout lateral ankle and foot  Palpation: +TTP maximally at the lateral malleolus with tenderness throughout the lateral ankle, dorsal foot, and mildly along the medial ankle  ROM: Limited by pain   Special testing: Neg squeeze test, no pain/click with Karolyn's   Neurovascular: 2+ dorsalis pedis and posterior tibial  Sensation intact and equal bilaterally  Gait: Antalgic    Cranial nerves II to XII grossly intact  No pronator drift  Pupils equal round reactive to light  EOM intact                   Assessment & Plan        1. Injury of left ankle, subsequent encounter      2. Concussion without  loss of consciousness, subsequent encounter      3. Encounter related to worker's compensation claim    Must wear walking boot while at work.  Should perform seated work only.  Should stop any work being done and rest if she has moderate headache.  Transfer of care to occupational medicine as per previous D39, Future appointments should be with occupational medicine  Continue to ice, elevate, NSAIDs/Tylenol as needed for pain and swelling to left lower extremity  Range of motion exercises of left ankle discussed  Discussed principles of rehab and transition to weightbearing as tolerated

## 2023-06-07 ENCOUNTER — OCCUPATIONAL MEDICINE (OUTPATIENT)
Dept: OCCUPATIONAL MEDICINE | Facility: CLINIC | Age: 53
End: 2023-06-07
Payer: COMMERCIAL

## 2023-06-07 VITALS
SYSTOLIC BLOOD PRESSURE: 136 MMHG | DIASTOLIC BLOOD PRESSURE: 80 MMHG | WEIGHT: 171.4 LBS | HEART RATE: 72 BPM | HEIGHT: 60 IN | TEMPERATURE: 98.6 F | BODY MASS INDEX: 33.65 KG/M2 | RESPIRATION RATE: 16 BRPM | OXYGEN SATURATION: 97 %

## 2023-06-07 DIAGNOSIS — S93.402D MODERATE LEFT ANKLE SPRAIN, SUBSEQUENT ENCOUNTER: ICD-10-CM

## 2023-06-07 PROCEDURE — 3075F SYST BP GE 130 - 139MM HG: CPT | Performed by: PREVENTIVE MEDICINE

## 2023-06-07 PROCEDURE — 3079F DIAST BP 80-89 MM HG: CPT | Performed by: PREVENTIVE MEDICINE

## 2023-06-07 PROCEDURE — 99203 OFFICE O/P NEW LOW 30 MIN: CPT | Performed by: PREVENTIVE MEDICINE

## 2023-06-07 NOTE — LETTER
[address],   [address] [address]  Phone:  [phone] - Fax:  [phone]   Occupational Health [facility] Progress Report and Disability Certification  Date of Service: 6/7/2023   No Show:  No  Date / Time of Next Visit: 6/21/2023 @10:45Am   Claim Information   Patient Name: Gabriela Carrillo  Claim Number:     Employer: [employer]  Date of Injury: 5/23/2023     Insurer / TPA: [employer]  ID / SSN:     Occupation: [employer]  Diagnosis: The encounter diagnosis was Moderate left ankle sprain, subsequent encounter.    Medical Information   Related to Industrial Injury? Yes   Subjective Complaints:  DOI: 5/23/2023: 53-year-old injured worker presents with left ankle and head injury. BRAYAN: Rolled ankle off a step at work causing injury to left ankle.  During the fall, hit left side of head on a metal box.  She was seen in urgent care x2 x-rays of the left ankle and foot were negative for acute findings.  She was given walking boot, NSAIDs and work restrictions.  Patient states she has been having some gradual improvement in symptoms.  She states the swelling is gone down as well as the pain.  She states she is able to walk little bit more than before, but it is still painful to walk.  She states she discontinued the walking boot on her own because it seemed to hurt more.  She has been taking ibuprofen and icing for relief.  Has been off work due to restrictions.  Denies postconcussive symptoms at this time.   Objective Findings: Left ankle: Mild swelling over the lateral malleolus.  Tenderness over the ATFL, CFL and PT FL.  Minimal tenderness over the Achilles tendon.  No medial ankle tenderness.  Full range of motion mild discomfort.  Strength with plantar/dorsiflexion slightly weak due to pain.  Antalgic gait.   Pre-Existing Condition(s):     Assessment:   Condition Same    Status: Additional Care Required  Permanent Disability:No    Plan:       Diagnostics:      Comments:  Okay to discontinue walking boot at home  Gradually increase walking as tolerated, patient will try walking on the treadmill at home at a low speed  OTC ibuprofen/Tylenol as needed  Ice 2-3 times per day as needed  Restricted duty  Follow-up 2 weeks if no significant improvement will consider referral to physical therapy    Disability Information   Status: Released to Restricted Duty    From:  6/7/2023  Through: 6/21/2023 Restrictions are: Temporary   Physical Restrictions   Sitting:    Standing:  < or = to 1 hr/day Stooping:    Bending:      Squatting:    Walking:  < or = to 1 hr/day Climbing:    Pushing:      Pulling:    Other:    Reaching Above Shoulder (L):   Reaching Above Shoulder (R):       Reaching Below Shoulder (L):    Reaching Below Shoulder (R):      Not to exceed Weight Limits   Carrying(hrs):   Weight Limit(lb):   Lifting(hrs):   Weight  Limit(lb):     Comments: Must wear walking boot at work.  Seated/sedentary work only    Repetitive Actions   Hands: i.e. Fine Manipulations from Grasping:     Feet: i.e. Operating Foot Controls:     Driving / Operate Machinery:     Health Care Provider’s Original or Electronic Signature  Derek Gutiérrez D.O. Health Care Provider’s Original or Electronic Signature    Derek Gutiérrez DO MPH     Clinic Name / Location: [address],   [address] [place] [address] Clinic Phone Number: Dept: [phone]   Appointment Time: 10:30 Am Visit Start Time: 10:15 AM   Check-In Time:  10:08 Am Visit Discharge Time:  10:53AM   Original-Treating Physician or Chiropractor    Page 2-Insurer/TPA    Page 3-Employer    Page 4-Employee

## 2023-06-07 NOTE — PROGRESS NOTES
Subjective:     Gabriela Carrillo is a 53 y.o. female who presents for Other (New wc doi: 5/23/2023 left ankle, head feeling slightly better rm 16)      DOI: 5/23/2023: 53-year-old injured worker presents with left ankle and head injury. BRAYAN: Rolled ankle off a step at work causing injury to left ankle.  During the fall, hit left side of head on a metal box.  She was seen in urgent care x2 x-rays of the left ankle and foot were negative for acute findings.  She was given walking boot, NSAIDs and work restrictions.  Patient states she has been having some gradual improvement in symptoms.  She states the swelling is gone down as well as the pain.  She states she is able to walk little bit more than before, but it is still painful to walk.  She states she discontinued the walking boot on her own because it seemed to hurt more.  She has been taking ibuprofen and icing for relief.  Has been off work due to restrictions.  Denies postconcussive symptoms at this time.    ROS: All systems were reviewed on intake form, form was reviewed and signed. See scanned documents in media. Pertinent positives and negatives included in HPI.    PMH: No pertinent past medical history to this problem  MEDS: Medications were reviewed in Epic  ALLERGIES: No Known Allergies  SOCHX: Works as a  at [employer]  FH: No pertinent family history to this problem       Objective:     /80 (BP Location: Right arm, Patient Position: Sitting, BP Cuff Size: Adult long)   Pulse 72   Temp 37 °C (98.6 °F) (Temporal)   Resp 16   Ht 1.524 m (5')   Wt 77.7 kg (171 lb 6.4 oz)   SpO2 97%   BMI 33.47 kg/m²     Constitutional: Patient is in no acute distress. Appears well-developed and well-nourished.   HENT: Normocephalic and atraumatic. EOM are normal. No scleral icterus.   Cardiovascular: Normal rate.    Pulmonary/Chest: Effort normal. No respiratory distress.   Neurological: Patient is alert and oriented to person, place, and  time.   Skin: Skin is warm and dry.   Psychiatric: Normal mood and affect. Behavior is normal.     Left ankle: Mild swelling over the lateral malleolus.  Tenderness over the ATFL, CFL and PT FL.  Minimal tenderness over the Achilles tendon.  No medial ankle tenderness.  Full range of motion mild discomfort.  Strength with plantar/dorsiflexion slightly weak due to pain.  Antalgic gait.    Assessment/Plan:       1. Moderate left ankle sprain, subsequent encounter    Released to Restricted Duty FROM 6/7/2023 TO 6/21/2023  Must wear walking boot at work.  Seated/sedentary work only  Okay to discontinue walking boot at home  Gradually increase walking as tolerated, patient will try walking on the treadmill at home at a low speed  OTC ibuprofen/Tylenol as needed  Ice 2-3 times per day as needed  Restricted duty  Follow-up 2 weeks if no significant improvement will consider referral to physical therapy    Differential diagnosis, natural history, supportive care, and indications for immediate follow-up discussed.    Approximately 35 minutes were spent in reviewing notes, preparing for visit, obtaining history, exam and evaluation, patient counseling/education and post visit documentation/orders.

## 2023-06-22 ENCOUNTER — OCCUPATIONAL MEDICINE (OUTPATIENT)
Dept: OCCUPATIONAL MEDICINE | Facility: CLINIC | Age: 53
End: 2023-06-22
Payer: COMMERCIAL

## 2023-06-22 VITALS
SYSTOLIC BLOOD PRESSURE: 148 MMHG | HEART RATE: 82 BPM | BODY MASS INDEX: 33.47 KG/M2 | DIASTOLIC BLOOD PRESSURE: 86 MMHG | OXYGEN SATURATION: 98 % | TEMPERATURE: 96.8 F | HEIGHT: 60 IN | RESPIRATION RATE: 16 BRPM

## 2023-06-22 DIAGNOSIS — S93.402D MODERATE LEFT ANKLE SPRAIN, SUBSEQUENT ENCOUNTER: ICD-10-CM

## 2023-06-22 PROCEDURE — 99213 OFFICE O/P EST LOW 20 MIN: CPT | Performed by: PREVENTIVE MEDICINE

## 2023-06-22 PROCEDURE — 3079F DIAST BP 80-89 MM HG: CPT | Performed by: PREVENTIVE MEDICINE

## 2023-06-22 PROCEDURE — 3077F SYST BP >= 140 MM HG: CPT | Performed by: PREVENTIVE MEDICINE

## 2023-06-22 NOTE — LETTER
[address],   [address] [address]  Phone:  [phone] - Fax:  [phone]   Occupational Health [facility] Progress Report and Disability Certification  Date of Service: 6/22/2023   No Show:  No  Date / Time of Next Visit: 7/20/2023 @ 10:45 AM   Claim Information   Patient Name: Gabriela Carrillo  Claim Number:     Employer: [employer]  Date of Injury: 5/23/2023     Insurer / TPA: [employer]  ID / SSN:     Occupation: [employer]  Diagnosis: The encounter diagnosis was Moderate left ankle sprain, subsequent encounter.    Medical Information   Related to Industrial Injury? Yes    Subjective Complaints:  DOI: 5/23/2023: 53-year-old injured worker presents with left ankle and head injury. BRAYAN: Rolled ankle off a step at work causing injury to left ankle.  During the fall, hit left side of head on a metal box.  X-rays of the left ankle and foot were negative for acute findings.  Patient states that pain wise and swelling wise symptoms are more or less the same.  Continues to have swelling on the lateral ankle.  However she does state that she is able to walk little bit more.  She is doing some walking on the treadmill and the range of motion exercises prescribed last visit.   Objective Findings: Left ankle: Mild swelling over the lateral malleolus.  Tenderness over the ATFL, CFL and PT FL.  Minimal tenderness over the Achilles tendon.  No medial ankle tenderness.  Full range of motion mild discomfort.  Strength with plantar/dorsiflexion slightly weak due to pain.  Antalgic gait.      Pre-Existing Condition(s):     Assessment:   Condition Same    Status: Additional Care Required  Permanent Disability:No    Plan:      Diagnostics:      Comments:  Given continued symptoms will refer to physical therapy  Continue to increase walking as tolerated  Continue range of motion exercises as demonstrated  OTC ibuprofen/Tylenol as needed  Ice 2-3 times per  day as needed  Restricted duty  Follow-up 3 weeks    Disability Information   Status: Released to Restricted Duty    From:  6/22/2023  Through: 7/20/2023 Restrictions are: Temporary   Physical Restrictions   Sitting:    Standing:  < or = to 1 hr/day Stooping:    Bending:      Squatting:    Walking:  < or = to 1 hr/day Climbing:    Pushing:      Pulling:    Other:    Reaching Above Shoulder (L):   Reaching Above Shoulder (R):       Reaching Below Shoulder (L):    Reaching Below Shoulder (R):      Not to exceed Weight Limits   Carrying(hrs):   Weight Limit(lb):   Lifting(hrs):   Weight  Limit(lb):     Comments: Must wear walking boot at work.  Seated/sedentary work only    Repetitive Actions   Hands: i.e. Fine Manipulations from Grasping:     Feet: i.e. Operating Foot Controls:     Driving / Operate Machinery:     Health Care Provider’s Original or Electronic Signature  Derek Gutiérrez D.O. Health Care Provider’s Original or Electronic Signature    Derek Gutiérrez DO MPH     Clinic Name / Location: [address] [place] [address] Clinic Phone Number: Dept: [phone]   Appointment Time: 10:45 Am Visit Start Time: 10:51 AM   Check-In Time:  10:43 Am Visit Discharge Time:  11:17 AM   Original-Treating Physician or Chiropractor    Page 2-Insurer/TPA    Page 3-Employer    Page 4-Employee

## 2023-06-22 NOTE — PROGRESS NOTES
Subjective:     Gabriela Carrillo is a 53 y.o. female who presents for No chief complaint on file.      DOI: 5/23/2023: 53-year-old injured worker presents with left ankle and head injury. BRAYAN: Rolled ankle off a step at work causing injury to left ankle.  During the fall, hit left side of head on a metal box.  X-rays of the left ankle and foot were negative for acute findings.  Patient states that pain wise and swelling wise symptoms are more or less the same.  Continues to have swelling on the lateral ankle.  However she does state that she is able to walk little bit more.  She is doing some walking on the treadmill and the range of motion exercises prescribed last visit.    ROS    SOCHX: Works as a  at [employer]  FH: No pertinent family history to this problem.       Objective:     BP (!) 148/86 (BP Location: Right arm, Patient Position: Sitting, BP Cuff Size: Adult long)   Pulse 82   Temp 36 °C (96.8 °F) (Temporal)   Resp 16   Ht 1.524 m (5')   SpO2 98%   BMI 33.47 kg/m²     Constitutional: Patient is in no acute distress. Appears well-developed and well-nourished.   Cardiovascular: Normal rate.    Pulmonary/Chest: Effort normal. No respiratory distress.   Neurological: Patient is alert and oriented to person, place, and time.   Skin: Skin is warm and dry.   Psychiatric: Normal mood and affect. Behavior is normal.     Left ankle: Mild swelling over the lateral malleolus.  Tenderness over the ATFL, CFL and PT FL.  Minimal tenderness over the Achilles tendon.  No medial ankle tenderness.  Full range of motion mild discomfort.  Strength with plantar/dorsiflexion slightly weak due to pain.  Antalgic gait.       Assessment/Plan:       1. Moderate left ankle sprain, subsequent encounter  - Referral to Physical Therapy    Released to Restricted Duty FROM 6/22/2023 TO 7/20/2023  Must wear walking boot at work.  Seated/sedentary work only    Given continued symptoms will refer to physical  therapy  Continue to increase walking as tolerated  Continue range of motion exercises as demonstrated  OTC ibuprofen/Tylenol as needed  Ice 2-3 times per day as needed  Restricted duty  Follow-up 3 weeks    Differential diagnosis, natural history, supportive care, and indications for immediate follow-up discussed.    Approximately 25 minutes was spent in preparing for visit, obtaining history, exam and evaluation, patient counseling/education and post visit documentation/orders.

## 2023-07-18 DIAGNOSIS — Z87.81 HISTORY OF PELVIC FRACTURE: ICD-10-CM

## 2023-07-18 DIAGNOSIS — Z78.0 POST-MENOPAUSAL: ICD-10-CM

## 2023-07-18 RX ORDER — IBUPROFEN 800 MG/1
800 TABLET ORAL EVERY 8 HOURS PRN
Qty: 30 TABLET | Refills: 0 | Status: SHIPPED | OUTPATIENT
Start: 2023-07-18 | End: 2023-08-31 | Stop reason: SDUPTHER

## 2023-07-20 ENCOUNTER — OCCUPATIONAL MEDICINE (OUTPATIENT)
Dept: OCCUPATIONAL MEDICINE | Facility: CLINIC | Age: 53
End: 2023-07-20
Payer: COMMERCIAL

## 2023-07-20 VITALS
RESPIRATION RATE: 16 BRPM | HEART RATE: 80 BPM | DIASTOLIC BLOOD PRESSURE: 76 MMHG | WEIGHT: 171 LBS | HEIGHT: 60 IN | BODY MASS INDEX: 33.57 KG/M2 | TEMPERATURE: 98.2 F | SYSTOLIC BLOOD PRESSURE: 122 MMHG | OXYGEN SATURATION: 98 %

## 2023-07-20 DIAGNOSIS — S93.402D MODERATE LEFT ANKLE SPRAIN, SUBSEQUENT ENCOUNTER: ICD-10-CM

## 2023-07-20 PROCEDURE — 3074F SYST BP LT 130 MM HG: CPT | Performed by: PREVENTIVE MEDICINE

## 2023-07-20 PROCEDURE — 99213 OFFICE O/P EST LOW 20 MIN: CPT | Performed by: PREVENTIVE MEDICINE

## 2023-07-20 PROCEDURE — 3078F DIAST BP <80 MM HG: CPT | Performed by: PREVENTIVE MEDICINE

## 2023-07-20 NOTE — LETTER
[address],   [address] [address]  Phone:  [phone] - Fax:  [phone]   Occupational Health [facility] Progress Report and Disability Certification  Date of Service: 7/20/2023   No Show:  No  Date / Time of Next Visit: 8/23/2023 AT 10:30AM   Claim Information   Patient Name: Gabriela Carrillo  Claim Number:     Employer: [employer]  Date of Injury: 5/23/2023     Insurer / TPA: [employer]  ID / SSN:     Occupation: [employer]  Diagnosis: The encounter diagnosis was Moderate left ankle sprain, subsequent encounter.    Medical Information   Related to Industrial Injury? Yes    Subjective Complaints:  DOI: 5/23/2023: 53-year-old injured worker presents with left ankle and head injury. BRAYAN: Rolled ankle off a step at work causing injury to left ankle.  During the fall, hit left side of head on a metal box.  X-rays of the left ankle and foot were negative for acute findings.    Patient states that overall symptoms are little bit improved but does continue have pain with ambulation.  Especially if she is on her feet for more than an hour.  She does continue with pain going up and down stairs.  Does get frequent swelling still.   Objective Findings: Left ankle: Mild swelling over the lateral malleolus.  Tenderness over the ATFL, CFL and PT FL.  Minimal tenderness over the Achilles tendon.  No medial ankle tenderness.  Full range of motion mild discomfort.  Strength with plantar/dorsiflexion slightly weak due to pain.  Antalgic gait.   Pre-Existing Condition(s):     Assessment:   Condition Improved    Status: Additional Care Required  Permanent Disability:No    Plan:      Diagnostics:      Comments:  Referral for MRI left ankle without, pending  Referral to physical therapy, approved, but first available appointment was September 14.  Continue to increase walking as tolerated  Continue range of motion exercises as demonstrated  OTC  ibuprofen/Tylenol as needed  Ice 2-3 times per day as needed  Restricted duty  Follow-up 4 weeks    Disability Information   Status: Released to Restricted Duty    From:  7/20/2023  Through: 8/23/2023 Restrictions are: Temporary   Physical Restrictions   Sitting:    Standing:  < or = to 2 hrs/day Stooping:    Bending:      Squatting:    Walking:  < or = to 2 hrs/day Climbing:    Pushing:      Pulling:    Other:    Reaching Above Shoulder (L):   Reaching Above Shoulder (R):       Reaching Below Shoulder (L):    Reaching Below Shoulder (R):      Not to exceed Weight Limits   Carrying(hrs):   Weight Limit(lb):   Lifting(hrs):   Weight  Limit(lb):     Comments: Seated/Sedentary work only    Repetitive Actions   Hands: i.e. Fine Manipulations from Grasping:     Feet: i.e. Operating Foot Controls:     Driving / Operate Machinery:     Health Care Provider’s Original or Electronic Signature  Derek Gutiérrez D.O. Health Care Provider’s Original or Electronic Signature    Derek Gutiérrez DO MPH     Clinic Name / Location: [place] [address]  [place] [address] Clinic Phone Number: Dept: [phone]   Appointment Time: 10:45 Am Visit Start Time: 10:38 AM   Check-In Time:  10:36 Am Visit Discharge Time:  11:12AM   Original-Treating Physician or Chiropractor    Page 2-Insurer/TPA    Page 3-Employer    Page 4-Employee

## 2023-07-20 NOTE — PROGRESS NOTES
Subjective:     Gabriela Carrillo is a 53 y.o. female who presents for Follow-Up (SAME -  09)      DOI: 5/23/2023: 53-year-old injured worker presents with left ankle and head injury. BRAYAN: Rolled ankle off a step at work causing injury to left ankle.  During the fall, hit left side of head on a metal box.  X-rays of the left ankle and foot were negative for acute findings.    Patient states that overall symptoms are little bit improved but does continue have pain with ambulation.  Especially if she is on her feet for more than an hour.  She does continue with pain going up and down stairs.  Does get frequent swelling still.    ROS    SOCHX: Works as a  at [employer]  FH: No pertinent family history to this problem.       Objective:     /76   Pulse 80   Temp 36.8 °C (98.2 °F) (Temporal)   Resp 16   Ht 1.524 m (5')   Wt 77.6 kg (171 lb)   SpO2 98%   BMI 33.40 kg/m²     Constitutional: Patient is in no acute distress. Appears well-developed and well-nourished.   Cardiovascular: Normal rate.    Pulmonary/Chest: Effort normal. No respiratory distress.   Neurological: Patient is alert and oriented to person, place, and time.   Skin: Skin is warm and dry.   Psychiatric: Normal mood and affect. Behavior is normal.     Left ankle: Mild swelling over the lateral malleolus.  Tenderness over the ATFL, CFL and PT FL.  Minimal tenderness over the Achilles tendon.  No medial ankle tenderness.  Full range of motion mild discomfort.  Strength with plantar/dorsiflexion slightly weak due to pain.  Antalgic gait.    Assessment/Plan:       1. Moderate left ankle sprain, subsequent encounter    Released to Restricted Duty FROM 7/20/2023 TO 8/23/2023  Seated/Sedentary work only    Referral for MRI left ankle without, pending  Referral to physical therapy, approved, but first available appointment was September 14.  Continue to increase walking as tolerated  Continue range of motion exercises as  demonstrated  OTC ibuprofen/Tylenol as needed  Ice 2-3 times per day as needed  Restricted duty  Follow-up 4 weeks    Differential diagnosis, natural history, supportive care, and indications for immediate follow-up discussed.    Approximately 25 minutes was spent in preparing for visit, obtaining history, exam and evaluation, patient counseling/education and post visit documentation/orders.

## 2023-08-23 ENCOUNTER — OCCUPATIONAL MEDICINE (OUTPATIENT)
Dept: OCCUPATIONAL MEDICINE | Facility: CLINIC | Age: 53
End: 2023-08-23
Payer: COMMERCIAL

## 2023-08-23 DIAGNOSIS — S93.402D MODERATE LEFT ANKLE SPRAIN, SUBSEQUENT ENCOUNTER: ICD-10-CM

## 2023-08-23 PROCEDURE — 99441 PR PHYSICIAN TELEPHONE EVALUATION 5-10 MIN: CPT | Mod: 95 | Performed by: PREVENTIVE MEDICINE

## 2023-08-23 NOTE — PROGRESS NOTES
Telephone Appointment Visit    This telephone visit was initiated by the patient and they verbally consented.    Reason for Call:  Symptom Follow-up    HPI:    DOI: 5/23/2023: 53-year-old injured worker presents with left ankle and head injury. BRAYAN: Rolled ankle off a step at work causing injury to left ankle.  During the fall, hit left side of head on a metal box.  X-rays of the left ankle and foot were negative for acute findings.    Patient states that overall symptoms are more or less the same.  She does have little less swelling.  But is still persistent on the lateral aspect of the ankle.  She states that she is walking tennis shoes now which do seem to help more.  She states that she is able to get the MRI scheduled for September 2.  She also has physical therapy scheduled begin September 14.  She is doing the home exercises that were prescribed which do seem to help somewhat.    Labs / Images Reviewed:   None    Assessment and Plan:     1. Moderate left ankle sprain, subsequent encounter  Referral for MRI left ankle without, scheduled for September 2  Referral to physical therapy, scheduled for September 14  Continue to increase walking as tolerated  Continue range of motion exercises as demonstrated  OTC ibuprofen/Tylenol as needed  Ice 2-3 times per day as needed  Restricted duty  Follow-up 4 weeks    Follow-up: 2 weeks    Total Time Spent (mins): 7    Derek Gutiérrez D.O.

## 2023-08-23 NOTE — LETTER
[address],   [address] [address]  Phone:  [phone] - Fax:  [phone]   Occupational Health [facility] Progress Report and Disability Certification  Date of Service: 8/23/2023   No Show:  No  Date / Time of Next Visit: 9/7/2023 @ 10:30 AM   Claim Information   Patient Name: Gabriela Carrillo  Claim Number:     Employer: [employer]  Date of Injury: 5/23/2023     Insurer / TPA: [employer]  ID / SSN:     Occupation: [employer]  Diagnosis: The encounter diagnosis was Moderate left ankle sprain, subsequent encounter.    Medical Information   Related to Industrial Injury? Yes    Subjective Complaints:  DOI: 5/23/2023: 53-year-old injured worker presents with left ankle and head injury. BRAYAN: Rolled ankle off a step at work causing injury to left ankle.  During the fall, hit left side of head on a metal box.  X-rays of the left ankle and foot were negative for acute findings.    Patient states that overall symptoms are more or less the same.  She does have little less swelling.  But is still persistent on the lateral aspect of the ankle.  She states that she is walking tennis shoes now which do seem to help more.  She states that she is able to get the MRI scheduled for September 2.  She also has physical therapy scheduled begin September 14.  She is doing the home exercises that were prescribed which do seem to help somewhat.   Objective Findings: None-telephone visit   Pre-Existing Condition(s):     Assessment:   Condition Same    Status: Additional Care Required  Permanent Disability:No    Plan:      Diagnostics:      Comments:  Referral for MRI left ankle without, scheduled for September 2  Referral to physical therapy, scheduled for September 14  Continue to increase walking as tolerated  Continue range of motion exercises as demonstrated  OTC ibuprofen/Tylenol as needed  Ice 2-3 times per day as needed  Restricted duty  Follow-up 4  weeks    Disability Information   Status: Released to Restricted Duty    From:  8/23/2023  Through: 9/7/2023 Restrictions are: Temporary   Physical Restrictions   Sitting:    Standing:  < or = to 2 hrs/day Stooping:    Bending:      Squatting:    Walking:  < or = to 2 hrs/day Climbing:    Pushing:      Pulling:    Other:    Reaching Above Shoulder (L):   Reaching Above Shoulder (R):       Reaching Below Shoulder (L):    Reaching Below Shoulder (R):      Not to exceed Weight Limits   Carrying(hrs):   Weight Limit(lb):   Lifting(hrs):   Weight  Limit(lb):     Comments: Seated/Sedentary work only     Repetitive Actions   Hands: i.e. Fine Manipulations from Grasping:     Feet: i.e. Operating Foot Controls:     Driving / Operate Machinery:     Health Care Provider’s Original or Electronic Signature  Derek Gutiérrez D.O. Health Care Provider’s Original or Electronic Signature    Derek Gutiérrez DO MPH     Clinic Name / Location: [address],   [address] [place] [address] [place] [address] Clinic Phone Number: Dept: [phone]   Appointment Time: 10:30 Am Visit Start Time: 10:50 AM   Check-In Time:  10:50 Am Visit Discharge Time:  11:32 AM   Original-Treating Physician or Chiropractor    Page 2-Insurer/TPA    Page 3-Employer    Page 4-Employee

## 2023-08-31 DIAGNOSIS — I10 PRIMARY HYPERTENSION: ICD-10-CM

## 2023-08-31 DIAGNOSIS — S32.811K: ICD-10-CM

## 2023-08-31 DIAGNOSIS — M85.80 OSTEOPENIA WITH HIGH RISK OF FRACTURE: ICD-10-CM

## 2023-08-31 DIAGNOSIS — Z78.0 POST-MENOPAUSAL: ICD-10-CM

## 2023-08-31 DIAGNOSIS — Z87.81 HISTORY OF PELVIC FRACTURE: ICD-10-CM

## 2023-08-31 RX ORDER — HYDROCHLOROTHIAZIDE 12.5 MG/1
12.5 CAPSULE, GELATIN COATED ORAL DAILY
Qty: 90 CAPSULE | Refills: 0 | Status: SHIPPED | OUTPATIENT
Start: 2023-08-31 | End: 2023-11-06 | Stop reason: SDUPTHER

## 2023-08-31 RX ORDER — ESCITALOPRAM OXALATE 10 MG/1
10 TABLET ORAL DAILY
Qty: 90 TABLET | Refills: 0 | Status: SHIPPED | OUTPATIENT
Start: 2023-08-31 | End: 2023-08-31

## 2023-08-31 RX ORDER — ALENDRONATE SODIUM 70 MG/1
70 TABLET ORAL
Qty: 4 TABLET | Refills: 0 | Status: SHIPPED | OUTPATIENT
Start: 2023-08-31 | End: 2023-10-02

## 2023-08-31 RX ORDER — OLMESARTAN MEDOXOMIL 20 MG/1
20 TABLET ORAL DAILY
Qty: 90 TABLET | Refills: 0 | Status: SHIPPED | OUTPATIENT
Start: 2023-08-31 | End: 2023-11-06 | Stop reason: SDUPTHER

## 2023-08-31 RX ORDER — IBUPROFEN 800 MG/1
800 TABLET ORAL EVERY 8 HOURS PRN
Qty: 30 TABLET | Refills: 0 | Status: SHIPPED | OUTPATIENT
Start: 2023-08-31 | End: 2023-11-06 | Stop reason: SDUPTHER

## 2023-08-31 RX ORDER — ESCITALOPRAM OXALATE 10 MG/1
10 TABLET ORAL DAILY
Qty: 90 TABLET | Refills: 0 | Status: SHIPPED | OUTPATIENT
Start: 2023-08-31 | End: 2023-11-06 | Stop reason: SDUPTHER

## 2023-09-07 ENCOUNTER — OCCUPATIONAL MEDICINE (OUTPATIENT)
Dept: OCCUPATIONAL MEDICINE | Facility: CLINIC | Age: 53
End: 2023-09-07
Payer: COMMERCIAL

## 2023-09-07 VITALS
RESPIRATION RATE: 16 BRPM | DIASTOLIC BLOOD PRESSURE: 82 MMHG | HEART RATE: 88 BPM | SYSTOLIC BLOOD PRESSURE: 126 MMHG | OXYGEN SATURATION: 96 % | TEMPERATURE: 97.3 F

## 2023-09-07 DIAGNOSIS — S93.402D MODERATE LEFT ANKLE SPRAIN, SUBSEQUENT ENCOUNTER: ICD-10-CM

## 2023-09-07 PROCEDURE — 3079F DIAST BP 80-89 MM HG: CPT | Performed by: PREVENTIVE MEDICINE

## 2023-09-07 PROCEDURE — 99213 OFFICE O/P EST LOW 20 MIN: CPT | Performed by: PREVENTIVE MEDICINE

## 2023-09-07 PROCEDURE — 3074F SYST BP LT 130 MM HG: CPT | Performed by: PREVENTIVE MEDICINE

## 2023-09-07 NOTE — PROGRESS NOTES
Subjective:     Gabriela Carrillo is a 53 y.o. female who presents for Follow-Up (Fu wc doi: 05/23/2023 left ankle, head feeling same rm 16)      DOI: 5/23/2023: 53-year-old injured worker presents with left ankle and head injury. BRAYAN: Rolled ankle off a step at work causing injury to left ankle.  During the fall, hit left side of head on a metal box.  X-rays of the left ankle and foot were negative for acute findings.     8/23/2023: Patient states that overall symptoms are more or less the same.  She does have little less swelling.  But is still persistent on the lateral aspect of the ankle.  She states that she is walking tennis shoes now which do seem to help more.  She states that she is able to get the MRI scheduled for September 2.  She also has physical therapy scheduled begin September 14.  She is doing the home exercises that were prescribed which do seem to help somewhat.    9/7/2023: Symptoms more or less the same.  Here for MRI results.  Starts physical therapy next week.    NURA    SOCHX: Works as a  at [employer]  FH: No pertinent family history to this problem.       Objective:     /82 (BP Location: Right arm, Patient Position: Sitting, BP Cuff Size: Adult)   Pulse 88   Temp 36.3 °C (97.3 °F) (Temporal)   Resp 16   SpO2 96%     Constitutional: Patient is in no acute distress. Appears well-developed and well-nourished.   Cardiovascular: Normal rate.    Pulmonary/Chest: Effort normal. No respiratory distress.   Neurological: Patient is alert and oriented to person, place, and time.   Skin: Skin is warm and dry.   Psychiatric: Normal mood and affect. Behavior is normal.     Left ankle: Mild swelling over the lateral malleolus.  Tenderness over the ATFL, CFL and PT FL.  Minimal tenderness over the Achilles tendon.  No medial ankle tenderness.  Full range of motion mild discomfort.  Antalgic gait.    MRI Ankle:  Intermediate grade sprains of the anterior talofibular, posterior  talofibular, and calcaneofibular ligaments. Low-grade sprain of the anterior distal tibiofibular syndesmotic ligament.  2. Mild peroneal tenosynovitis with a questionable short-segment split tear of the peroneus brevis tendon seen on a single slice.  3. Likely reactive marrow edema within the posterior aspect of the lateral malleolus without fracture line.      Assessment/Plan:       1. Moderate left ankle sprain, subsequent encounter    Released to Full Duty FROM 9/7/2023 TO 10/12/2023       Begin physical therapy as scheduled  Continue to increase walking as tolerated  Continue range of motion exercises as demonstrated  OTC ibuprofen/Tylenol as needed  Ice 2-3 times per day as needed  Restricted duty  Follow-up 4 weeks    Differential diagnosis, natural history, supportive care, and indications for immediate follow-up discussed.    Approximately 25 minutes was spent in preparing for visit, obtaining history, exam and evaluation, patient counseling/education and post visit documentation/orders.

## 2023-09-07 NOTE — LETTER
[address],   [address] [place] [address]  Phone:  [phone] - Fax:  [phone]   Occupational Health 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    Medical Information   Related to Industrial Injury? Yes ***   Subjective Complaints:  DOI: 5/23/2023: 53-year-old injured worker presents with left ankle and head injury. BRAYAN: Rolled ankle off a step at work causing injury to left ankle.  During the fall, hit left side of head on a metal box.  X-rays of the left ankle and foot were negative for acute findings.     8/23/2023: Patient states that overall symptoms are more or less the same.  She does have little less swelling.  But is still persistent on the lateral aspect of the ankle.  She states that she is walking tennis shoes now which do seem to help more.  She states that she is able to get the MRI scheduled for September 2.  She also has physical therapy scheduled begin September 14.  She is doing the home exercises that were prescribed which do seem to help somewhat.    9/7/2023: Symptoms more or less the same.  Here for MRI results.  Starts physical therapy next week.   Objective Findings: Left ankle: Mild swelling over the lateral malleolus.  Tenderness over the ATFL, CFL and PT FL.  Minimal tenderness over the Achilles tendon.  No medial ankle tenderness.  Full range of motion mild discomfort.  Antalgic gait.    MRI Ankle:  Intermediate grade sprains of the anterior talofibular, posterior talofibular, and calcaneofibular ligaments. Low-grade sprain of the anterior  distal tibiofibular syndesmotic ligament.  2. Mild peroneal tenosynovitis with a questionable short-segment split tear of the peroneus brevis tendon seen on a single slice.  3. Likely reactive marrow edema within the posterior aspect of the lateral malleolus without fracture line.     Pre-Existing Condition(s):     Assessment:   Condition Same    Status: Additional Care Required  Permanent Disability:No    Plan:      Diagnostics:      Comments:  Begin physical therapy as scheduled  Continue to increase walking as tolerated  Continue range of motion exercises as demonstrated  OTC ibuprofen/Tylenol as needed  Ice 2-3 times per day as needed  Restricted duty  Follow-up 4 weeks    Disability Information   Status: Released to Full Duty    From:  9/7/2023  Through: 10/12/2023 Restrictions are:     Physical Restrictions   Sitting:    Standing:    Stooping:    Bending:      Squatting:    Walking:    Climbing:    Pushing:      Pulling:    Other:    Reaching Above Shoulder (L):   Reaching Above Shoulder (R):       Reaching Below Shoulder (L):    Reaching Below Shoulder (R):      Not to exceed Weight Limits   Carrying(hrs):   Weight Limit(lb):   Lifting(hrs):   Weight  Limit(lb):     Comments:      Repetitive Actions   Hands: i.e. Fine Manipulations from Grasping:     Feet: i.e. Operating Foot Controls:     Driving / Operate Machinery:     Health Care Provider’s Original or Electronic Signature  Derek Gutiérrez D.O. Health Care Provider’s Original or Electronic Signature    Derek Gutiérrez DO MPH     Clinic Name / Location: [address] [address] Clinic Phone Number: Dept: [phone]   Appointment Time: 10:30 Am Visit Start Time: 10:07 AM   Check-In Time:  10:05 Am Visit Discharge Time:  ***   Original-Treating Physician or Chiropractor    Page 2-Insurer/TPA    Page 3-Employer    Page 4-Employee

## 2023-09-07 NOTE — LETTER
[address],   [address] [address]  Phone:  [phone] - Fax:  [phone]   Occupational Health [facility] Progress Report and Disability Certification  Date of Service: 9/7/2023   No Show:  No  Date / Time of Next Visit: 10/11/2023 @ 10:30 AM   Claim Information   Patient Name: Gabriela Carrillo  Claim Number:     Employer: [employer]  Date of Injury: 5/23/2023     Insurer / TPA: [employer]  ID / SSN:     Occupation: Production [employer]  Diagnosis: The encounter diagnosis was Moderate left ankle sprain, subsequent encounter.    Medical Information   Related to Industrial Injury? Yes    Subjective Complaints:  DOI: 5/23/2023: 53-year-old injured worker presents with left ankle and head injury. BRAYAN: Rolled ankle off a step at work causing injury to left ankle.  During the fall, hit left side of head on a metal box.  X-rays of the left ankle and foot were negative for acute findings.     8/23/2023: Patient states that overall symptoms are more or less the same.  She does have little less swelling.  But is still persistent on the lateral aspect of the ankle.  She states that she is walking tennis shoes now which do seem to help more.  She states that she is able to get the MRI scheduled for September 2.  She also has physical therapy scheduled begin September 14.  She is doing the home exercises that were prescribed which do seem to help somewhat.    9/7/2023: Symptoms more or less the same.  Here for MRI results.  Starts physical therapy next week.   Objective Findings: Left ankle: Mild swelling over the lateral malleolus.  Tenderness over the ATFL, CFL and PT FL.  Minimal tenderness over the Achilles tendon.  No medial ankle tenderness.  Full range of motion mild discomfort.  Antalgic gait.    MRI Ankle:  Intermediate grade sprains of the anterior talofibular, posterior talofibular, and calcaneofibular ligaments. Low-grade sprain of the anterior  distal tibiofibular syndesmotic ligament.  2. Mild peroneal tenosynovitis with a questionable short-segment split tear of the peroneus brevis tendon seen on a single slice.  3. Likely reactive marrow edema within the posterior aspect of the lateral malleolus without fracture line.     Pre-Existing Condition(s):     Assessment:   Condition Same    Status: Additional Care Required  Permanent Disability:No    Plan:      Diagnostics:      Comments:  Begin physical therapy as scheduled  Continue to increase walking as tolerated  Continue range of motion exercises as demonstrated  OTC ibuprofen/Tylenol as needed  Ice 2-3 times per day as needed  Restricted duty  Follow-up 4 weeks    Disability Information   Status: Released to Restricted Duty    From:  9/7/2023  Through: 10/12/2023 Restrictions are: Temporary   Physical Restrictions   Sitting:    Standing:  < or = to 4 hrs/day Stooping:    Bending:      Squatting:    Walking:  < or = to 4 hrs/day Climbing:    Pushing:      Pulling:    Other:    Reaching Above Shoulder (L):   Reaching Above Shoulder (R):       Reaching Below Shoulder (L):    Reaching Below Shoulder (R):      Not to exceed Weight Limits   Carrying(hrs):   Weight Limit(lb):   Lifting(hrs):   Weight  Limit(lb):     Comments:      Repetitive Actions   Hands: i.e. Fine Manipulations from Grasping:     Feet: i.e. Operating Foot Controls:     Driving / Operate Machinery:     Health Care Provider’s Original or Electronic Signature  Derek Gutiérrez D.O. Health Care Provider’s Original or Electronic Signature    Derek Gutiérrez DO MPH     Clinic Name / Location: [place] [address]  [place] [address] Clinic Phone Number: Dept: [phone]   Appointment Time: 10:30 Am Visit Start Time: 10:07 AM   Check-In Time:  10:05 Am Visit Discharge Time:  10:50 AM   Original-Treating Physician or Chiropractor    Page 2-Insurer/TPA    Page 3-Employer    Page 4-Employee

## 2023-09-14 ENCOUNTER — APPOINTMENT (OUTPATIENT)
Dept: PHYSICAL THERAPY | Facility: REHABILITATION | Age: 53
End: 2023-09-14
Attending: PREVENTIVE MEDICINE
Payer: COMMERCIAL

## 2023-09-21 ENCOUNTER — PHYSICAL THERAPY (OUTPATIENT)
Dept: PHYSICAL THERAPY | Facility: REHABILITATION | Age: 53
End: 2023-09-21
Attending: PREVENTIVE MEDICINE
Payer: COMMERCIAL

## 2023-09-21 DIAGNOSIS — M25.572 LEFT LATERAL ANKLE PAIN: ICD-10-CM

## 2023-09-21 PROCEDURE — 97014 ELECTRIC STIMULATION THERAPY: CPT

## 2023-09-21 PROCEDURE — 97110 THERAPEUTIC EXERCISES: CPT

## 2023-09-21 PROCEDURE — 97161 PT EVAL LOW COMPLEX 20 MIN: CPT

## 2023-09-21 ASSESSMENT — ENCOUNTER SYMPTOMS
PAIN SCALE AT HIGHEST: 7
PAIN SCALE: 0
QUALITY: BURNING
PAIN SCALE AT LOWEST: 0

## 2023-09-21 NOTE — OP THERAPY EVALUATION
Outpatient Physical Therapy  INITIAL EVALUATION    [facility] Physical Therapy [address]  [address]  [place] [address]  Phone:  [phone]  Fax:  [phone]    Date of Evaluation: 2023    Patient: Gabriela Carrillo  YOB: 1970  MRN: [id number]     Referring Provider: Derek Gutiérrez D.O.  [address] [facility] [address]  [place] [address]   Referring Diagnosis Sprain of unspecified ligament of left ankle, subsequent encounter [S93.402D]     Time Calculation    155  255           Chief Complaint: No chief complaint on file.    Visit Diagnoses     ICD-10-CM   1. Left lateral ankle pain  M25.572       Date of onset of impairment: 2023    Subjective   History of Present Illness:     History of chief complaint:  Patient missed a step and twisted her ankle of  May 23 2023  while at work year.  Was put in a walking boot for a month.  Patient reports that she is significantly better with occasional  pain and tightness.  Alexepro, HTN  managed with meds--pt. 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    Pain:     Current pain ratin    At best pain ratin    At worst pain ratin    Location:  Lateral ankle, lateral lower leg--with noted swelling    Quality:  Burning    Aggravating factors:  LEFS 51/80  Walk for more than 60'  Wash dishes for more than 10' w/o pain  Stande for more than 35' w/o sx  Up/down more than 5 steps w/o pain--down is more difficult          Past Medical History:   Diagnosis Date    Bilateral ovarian cysts     Hypertension     Murmur, cardiac     no cardiologist    Osteopenia      Past Surgical History:   Procedure Laterality Date    PB OPEN INTERN FIX ANTER PELV RING FX Right 2023    Procedure: RIGHT PERCUTANEOUS FIXATION ANTERIOR PELVIC RING;  Surgeon: Geovanni Marcial M.D.;  Location: SURGERY [facility];  Service: Orthopedics    GYN LAPAROSCOPY    "      Precautions:       Objective   Observation and functional movement:  Decreased L stride      Range of motion and strength:    PF: 60 deg  Df 10  Knee to wall  L 3.5 cm    Sensation and reflexes:     Girth: figure of 8 :R: 19.5  L: 21 3/8    Palpation and joint mobility:     Ttp FL, ant tib, fib melba--non tender malleoli          Therapeutic Treatments and Modalities:     Therapeutic Treatment and Modalities Summary: Step 6\" painful  Cupping fl, ant tib, fb, with arom // steps x 4 \" no pain\"  Hep sls balance progress to eyes closed  [place] fl. Ant tib 5/5/ mhp x 15'  Kinesio tape      Time-based treatments/modalities:           Assessment, Response and Plan:   Assessment details:  Patient present with history for L ankle pain s/p slip/trip  fall at work while missing a step. Patient presented with decreased arm swing, decrease transverse plane pelvic motion with ambulation.  Patient demonstrates limited sls balance with slef limiting pain behavior--noted slight loss of functional DF .  Patient reported TTP Fibularis longus/brevis and ant tib..  Patient shounld do well if compliant with poc.   Prognosis: fair    Goals:   Short Term Goals:   LEFS >60/80  Walk for more than 60'  Wash dishes for more than 10' w/o pain  Stand for more than 35' w/o sx  Up/down more than 5 steps w/o pain--down is more difficult    Short term goal time span:  2-4 weeks      Long Term Goals:    LEFS >70/80  Walk for more than 90'  Wash dishes for more than 30' w/o pain  Stand for more than 90' w/o sx  Up/down more than 15 steps w/o pain  Long term goal time span:  6-8 weeks    Plan:   Therapy options:  Physical therapy treatment to continue  Planned therapy interventions:  E Stim Unattended (CPT 39398), Manual Therapy (CPT 63933), Therapeutic Exercise (CPT 39201) and Neuromuscular Re-education (CPT 53602)  Other planned therapy interventions:  Dry needle  Frequency:  2x week  Duration in weeks:  6  Duration in visits:  12  Plan details: "  Iastm cupping, stm joint mobs, e-stim sdn, balance trg, gait trg, taping      Functional Assessment Used        Referring provider co-signature:  I have reviewed this plan of care and my co-signature certifies the need for services.    Certification Period: 09/21/2023 to  11/24/23    Physician Signature: ________________________________ Date: ______________

## 2023-09-24 ENCOUNTER — HOSPITAL ENCOUNTER (EMERGENCY)
Facility: MEDICAL CENTER | Age: 53
End: 2023-09-24
Attending: EMERGENCY MEDICINE
Payer: COMMERCIAL

## 2023-09-24 ENCOUNTER — APPOINTMENT (OUTPATIENT)
Dept: RADIOLOGY | Facility: MEDICAL CENTER | Age: 53
End: 2023-09-24
Attending: EMERGENCY MEDICINE
Payer: COMMERCIAL

## 2023-09-24 VITALS
HEART RATE: 68 BPM | BODY MASS INDEX: 33.28 KG/M2 | DIASTOLIC BLOOD PRESSURE: 100 MMHG | RESPIRATION RATE: 16 BRPM | WEIGHT: 170.42 LBS | OXYGEN SATURATION: 94 % | TEMPERATURE: 98 F | SYSTOLIC BLOOD PRESSURE: 176 MMHG

## 2023-09-24 DIAGNOSIS — I16.0 HYPERTENSIVE URGENCY: ICD-10-CM

## 2023-09-24 LAB
ALBUMIN SERPL BCP-MCNC: 4.5 G/DL (ref 3.2–4.9)
ALBUMIN/GLOB SERPL: 1.5 G/DL
ALP SERPL-CCNC: 88 U/L (ref 30–99)
ALT SERPL-CCNC: 36 U/L (ref 2–50)
ANION GAP SERPL CALC-SCNC: 14 MMOL/L (ref 7–16)
AST SERPL-CCNC: 35 U/L (ref 12–45)
BASOPHILS # BLD AUTO: 0.7 % (ref 0–1.8)
BASOPHILS # BLD: 0.05 K/UL (ref 0–0.12)
BILIRUB SERPL-MCNC: 0.8 MG/DL (ref 0.1–1.5)
BUN SERPL-MCNC: 12 MG/DL (ref 8–22)
CALCIUM ALBUM COR SERPL-MCNC: 8.4 MG/DL (ref 8.5–10.5)
CALCIUM SERPL-MCNC: 8.8 MG/DL (ref 8.5–10.5)
CHLORIDE SERPL-SCNC: 101 MMOL/L (ref 96–112)
CO2 SERPL-SCNC: 23 MMOL/L (ref 20–33)
CREAT SERPL-MCNC: 0.79 MG/DL (ref 0.5–1.4)
EKG IMPRESSION: NORMAL
EOSINOPHIL # BLD AUTO: 0.13 K/UL (ref 0–0.51)
EOSINOPHIL NFR BLD: 1.9 % (ref 0–6.9)
ERYTHROCYTE [DISTWIDTH] IN BLOOD BY AUTOMATED COUNT: 46.3 FL (ref 35.9–50)
GFR SERPLBLD CREATININE-BSD FMLA CKD-EPI: 88 ML/MIN/1.73 M 2
GLOBULIN SER CALC-MCNC: 3.1 G/DL (ref 1.9–3.5)
GLUCOSE SERPL-MCNC: 102 MG/DL (ref 65–99)
HCT VFR BLD AUTO: 39 % (ref 37–47)
HGB BLD-MCNC: 13.7 G/DL (ref 12–16)
IMM GRANULOCYTES # BLD AUTO: 0.02 K/UL (ref 0–0.11)
IMM GRANULOCYTES NFR BLD AUTO: 0.3 % (ref 0–0.9)
LYMPHOCYTES # BLD AUTO: 1.71 K/UL (ref 1–4.8)
LYMPHOCYTES NFR BLD: 25.3 % (ref 22–41)
MCH RBC QN AUTO: 33.5 PG (ref 27–33)
MCHC RBC AUTO-ENTMCNC: 35.1 G/DL (ref 32.2–35.5)
MCV RBC AUTO: 95.4 FL (ref 81.4–97.8)
MONOCYTES # BLD AUTO: 0.56 K/UL (ref 0–0.85)
MONOCYTES NFR BLD AUTO: 8.3 % (ref 0–13.4)
NEUTROPHILS # BLD AUTO: 4.29 K/UL (ref 1.82–7.42)
NEUTROPHILS NFR BLD: 63.5 % (ref 44–72)
NRBC # BLD AUTO: 0 K/UL
NRBC BLD-RTO: 0 /100 WBC (ref 0–0.2)
PLATELET # BLD AUTO: 255 K/UL (ref 164–446)
PMV BLD AUTO: 8.5 FL (ref 9–12.9)
POTASSIUM SERPL-SCNC: 3.4 MMOL/L (ref 3.6–5.5)
PROT SERPL-MCNC: 7.6 G/DL (ref 6–8.2)
RBC # BLD AUTO: 4.09 M/UL (ref 4.2–5.4)
SODIUM SERPL-SCNC: 138 MMOL/L (ref 135–145)
TROPONIN T SERPL-MCNC: 8 NG/L (ref 6–19)
WBC # BLD AUTO: 6.8 K/UL (ref 4.8–10.8)

## 2023-09-24 PROCEDURE — 71045 X-RAY EXAM CHEST 1 VIEW: CPT

## 2023-09-24 PROCEDURE — 93005 ELECTROCARDIOGRAM TRACING: CPT | Performed by: EMERGENCY MEDICINE

## 2023-09-24 PROCEDURE — 96374 THER/PROPH/DIAG INJ IV PUSH: CPT

## 2023-09-24 PROCEDURE — 85025 COMPLETE CBC W/AUTO DIFF WBC: CPT

## 2023-09-24 PROCEDURE — 700111 HCHG RX REV CODE 636 W/ 250 OVERRIDE (IP): Performed by: EMERGENCY MEDICINE

## 2023-09-24 PROCEDURE — 99284 EMERGENCY DEPT VISIT MOD MDM: CPT

## 2023-09-24 PROCEDURE — 36415 COLL VENOUS BLD VENIPUNCTURE: CPT

## 2023-09-24 PROCEDURE — 84484 ASSAY OF TROPONIN QUANT: CPT

## 2023-09-24 PROCEDURE — 80053 COMPREHEN METABOLIC PANEL: CPT

## 2023-09-24 RX ORDER — LABETALOL HYDROCHLORIDE 5 MG/ML
10 INJECTION, SOLUTION INTRAVENOUS ONCE
Status: COMPLETED | OUTPATIENT
Start: 2023-09-24 | End: 2023-09-24

## 2023-09-24 RX ADMIN — LABETALOL HYDROCHLORIDE 10 MG: 5 INJECTION INTRAVENOUS at 10:35

## 2023-09-24 NOTE — DISCHARGE INSTRUCTIONS
Return immediately for crushing chest pain trouble breathing leg swelling stroke symptoms.  Follow-up with your regular doctor for blood pressure management as discussed

## 2023-09-24 NOTE — ED NOTES
Chief Complaint   Patient presents with    Blood Pressure Problem     Patient to yellow 56 c/o above. Patient history of HTN, states she was just placed on new medication and unsure if she is meant to take both anti-hypertensives at this same time. Patient denies chest pain.

## 2023-09-28 ENCOUNTER — APPOINTMENT (OUTPATIENT)
Dept: PHYSICAL THERAPY | Facility: REHABILITATION | Age: 53
End: 2023-09-28
Attending: PREVENTIVE MEDICINE
Payer: COMMERCIAL

## 2023-10-05 ENCOUNTER — PHYSICAL THERAPY (OUTPATIENT)
Dept: PHYSICAL THERAPY | Facility: REHABILITATION | Age: 53
End: 2023-10-05
Attending: PREVENTIVE MEDICINE
Payer: COMMERCIAL

## 2023-10-05 DIAGNOSIS — M25.572 LEFT LATERAL ANKLE PAIN: ICD-10-CM

## 2023-10-05 PROCEDURE — 97112 NEUROMUSCULAR REEDUCATION: CPT

## 2023-10-05 PROCEDURE — 97110 THERAPEUTIC EXERCISES: CPT

## 2023-10-05 NOTE — OP THERAPY DAILY TREATMENT
Outpatient Physical Therapy  DAILY TREATMENT     [facility] Physical [address]  [address]  [place] [address]  Phone:  [phone]  Fax:  [phone]    Date: 10/05/2023    Patient: Gabriela Carrillo  YOB: 1970  MRN: [id number]     Time Calculation    Start time: 1400  Stop time: 1450 Time Calculation (min): 50 minutes         Chief Complaint: Ankle Injury    Visit #: 2    SUBJECTIVE:  Pt reports having increased walking to 1-2miles/daily. Pain has been better, last night throbbing, pain will come on after being on it all day. Moving to a new neighborhood, has been stressful and a lot physically, looking forward to level ground to be walking on .   Hasn't been performing SLS HEP only  when able to think about it.      OBJECTIVE:  Current objective measures:           Therapeutic Exercises (CPT 10634):     1. gastroc wall stretch, 30sec x 3 bilat, HEP    2. bridging, 10 x 2, cued to maintain spinal neutral-HEP    3. Running man sidelying, 5 x 1 bilat, R>L difficulty, reported back pain, tc's for lumbar/hip dissociation-HEP    Therapeutic Treatments and Modalities:     1. Manual Therapy (CPT 39132), see below    2. Gait Training (CPT 36342), see below    Therapeutic Treatment and Modalities Summary: MT: IASTM L tib and, fib fanning, lifting and hooking, mod fascial restrictions noted & erythema. Cupping applied to tib ant and fib compartment to inc blood and decrease fascial restrictions to improve motor recruitment with gait.  In supine Talocrural jt ant/post mobs grade 3-4 to inc DF, Subtalar med/lat jt mobs grade 3-4 to inc ankle EV.  Gait training: pre/post MT Demo wide MILEY, L tibial ER for decreased loading with decreased DF.  Post MT: cued for heel toe/heel strike off great toe, focus on neutral foot alignment, demo ability to maintain neutral tibial alignment and push off from great toe       Time-based treatments/modalities:    Physical Therapy Timed Treatment  Charges  Neuromusc re-ed, balance, coor, post minutes (CPT 48809): 15 minutes  Therapeutic exercise minutes (CPT 48737): 35 minutes      ASSESSMENT:   Response to treatment: demo ability to maintain neutral tibial alignment and push off from great toe improving ankle alignment and stability.  Fatigues quickly, low endurance.      PLAN/RECOMMENDATIONS:   Plan for treatment: therapy treatment to continue next visit.  Planned interventions for next visit: continue with current treatment.

## 2023-10-11 ENCOUNTER — OCCUPATIONAL MEDICINE (OUTPATIENT)
Dept: OCCUPATIONAL MEDICINE | Facility: CLINIC | Age: 53
End: 2023-10-11
Payer: COMMERCIAL

## 2023-10-11 VITALS
TEMPERATURE: 96.6 F | OXYGEN SATURATION: 99 % | DIASTOLIC BLOOD PRESSURE: 74 MMHG | SYSTOLIC BLOOD PRESSURE: 126 MMHG | RESPIRATION RATE: 16 BRPM | HEART RATE: 114 BPM

## 2023-10-11 DIAGNOSIS — S93.402D MODERATE LEFT ANKLE SPRAIN, SUBSEQUENT ENCOUNTER: ICD-10-CM

## 2023-10-11 PROCEDURE — 3078F DIAST BP <80 MM HG: CPT | Performed by: PREVENTIVE MEDICINE

## 2023-10-11 PROCEDURE — 99213 OFFICE O/P EST LOW 20 MIN: CPT | Performed by: PREVENTIVE MEDICINE

## 2023-10-11 PROCEDURE — 3074F SYST BP LT 130 MM HG: CPT | Performed by: PREVENTIVE MEDICINE

## 2023-10-11 NOTE — PROGRESS NOTES
Subjective:     Gabriela Carrillo is a 53 y.o. female who presents for Follow-Up (Fu wc doi: 05/23/2023 left ankle, head feeling same rm 19)      DOI: 5/23/2023: 53-year-old injured worker presents with left ankle and head injury. BRAYAN: Rolled ankle off a step at work causing injury to left ankle.  During the fall, hit left side of head on a metal box.  X-rays of the left ankle and foot were negative for acute findings.     8/23/2023: Patient states that overall symptoms are more or less the same.  She does have little less swelling.  But is still persistent on the lateral aspect of the ankle.  She states that she is walking tennis shoes now which do seem to help more.  She states that she is able to get the MRI scheduled for September 2.  She also has physical therapy scheduled begin September 14.  She is doing the home exercises that were prescribed which do seem to help somewhat.     9/7/2023: Symptoms more or less the same.  Here for MRI results.  Starts physical therapy next week.    10/11/2023: Patient states overall symptoms have improved little bit.  She states this has not been less swelling than before.  She has started physical therapy and has attended 2 sessions.  She feels like it has been helpful.  However she still has difficulty with any prolonged standing or walking.  Does not feel ready to be standing and walking on her feet for 12 hours/day.  Has physical therapy visit scheduled once per week for the next several weeks.    ROS    SOCHX: Works as a a  at [email or web address]  FH: No pertinent family history to this problem.       Objective:     /74 (BP Location: Right arm, Patient Position: Sitting, BP Cuff Size: Adult long)   Pulse (!) 114   Temp 35.9 °C (96.6 °F) (Temporal)   Resp 16   SpO2 99%     Constitutional: Patient is in no acute distress. Appears well-developed and well-nourished.   Cardiovascular: Normal rate.    Pulmonary/Chest: Effort normal. No respiratory distress.    Neurological: Patient is alert and oriented to person, place, and time.   Skin: Skin is warm and dry.   Psychiatric: Normal mood and affect. Behavior is normal.     Left ankle: Minimal swelling over the lateral malleolus.  Tenderness over the ATFL, CFL and PT FL.  Minimal tenderness over the Achilles tendon.  No medial ankle tenderness.  Full range of motion mild discomfort.  Antalgic gait.     MRI Ankle:  Intermediate grade sprains of the anterior talofibular, posterior talofibular, and calcaneofibular ligaments. Low-grade sprain of the anterior distal tibiofibular syndesmotic ligament.  2. Mild peroneal tenosynovitis with a questionable short-segment split tear of the peroneus brevis tendon seen on a single slice.  3. Likely reactive marrow edema within the posterior aspect of the lateral malleolus without fracture line.    Assessment/Plan:       1. Moderate left ankle sprain, subsequent encounter    Released to Restricted Duty FROM 10/11/2023 TO 11/13/2023       Continue physical therapy  Continue to increase walking as tolerated  Continue range of motion exercises as demonstrated  OTC ibuprofen/Tylenol as needed  Ice 2-3 times per day as needed  Restricted duty  Follow-up 4 weeks     Differential diagnosis, natural history, supportive care, and indications for immediate follow-up discussed.    Approximately 25 minutes was spent in preparing for visit, obtaining history, exam and evaluation, patient counseling/education and post visit documentation/orders.

## 2023-10-11 NOTE — LETTER
[address],   [address] [place] [address]  Phone:  [phone] - Fax:  [phone]   Occupational Health [facility] Progress Report and Disability Certification  Date of Service: 10/11/2023   No Show:  No  Date / Time of Next Visit: 11/13/2023 10:30AM   Claim Information   Patient Name: Gabriela Carrillo  Claim Number:     Employer: [employer]  Date of Injury: 5/23/2023     Insurer / TPA: [employer]  ID / SSN:     Occupation: [employer]  Diagnosis: The encounter diagnosis was Moderate left ankle sprain, subsequent encounter.    Medical Information   Related to Industrial Injury? Yes    Subjective Complaints:  DOI: 5/23/2023: 53-year-old injured worker presents with left ankle and head injury. BRAYAN: Rolled ankle off a step at work causing injury to left ankle.  During the fall, hit left side of head on a metal box.  X-rays of the left ankle and foot were negative for acute findings.     8/23/2023: Patient states that overall symptoms are more or less the same.  She does have little less swelling.  But is still persistent on the lateral aspect of the ankle.  She states that she is walking tennis shoes now which do seem to help more.  She states that she is able to get the MRI scheduled for September 2.  She also has physical therapy scheduled begin September 14.  She is doing the home exercises that were prescribed which do seem to help somewhat.     9/7/2023: Symptoms more or less the same.  Here for MRI results.  Starts physical therapy next week.    10/11/2023: Patient states overall symptoms have improved little bit.  She states this has not been less swelling than before.  She has started physical therapy and has attended 2 sessions.  She feels like it has been helpful.  However she still has difficulty with any prolonged standing or walking.  Does not feel ready to be standing and walking on her feet for 12 hours/day.  Has physical therapy  visit scheduled once per week for the next several weeks.   Objective Findings: Left ankle: Minimal swelling over the lateral malleolus.  Tenderness over the ATFL, CFL and PT FL.  Minimal tenderness over the Achilles tendon.  No medial ankle tenderness.  Full range of motion mild discomfort.  Antalgic gait.     MRI Ankle:  Intermediate grade sprains of the anterior talofibular, posterior talofibular, and calcaneofibular ligaments. Low-grade sprain of the anterior distal tibiofibular syndesmotic ligament.  2. Mild peroneal tenosynovitis with a questionable short-segment split tear of the peroneus brevis tendon seen on a single slice.  3. Likely reactive marrow edema within the posterior aspect of the lateral malleolus without fracture line.   Pre-Existing Condition(s):     Assessment:   Condition Same    Status: Additional Care Required  Permanent Disability:No    Plan:      Diagnostics:      Comments:  Continue physical therapy  Continue to increase walking as tolerated  Continue range of motion exercises as demonstrated  OTC ibuprofen/Tylenol as needed  Ice 2-3 times per day as needed  Restricted duty  Follow-up 4 weeks     Disability Information   Status: Released to Restricted Duty    From:  10/11/2023  Through: 11/13/2023 Restrictions are: Temporary   Physical Restrictions   Sitting:    Standing:  < or = to 4 hrs/day Stooping:    Bending:      Squatting:    Walking:  < or = to 4 hrs/day Climbing:    Pushing:      Pulling:    Other:    Reaching Above Shoulder (L):   Reaching Above Shoulder (R):       Reaching Below Shoulder (L):    Reaching Below Shoulder (R):      Not to exceed Weight Limits   Carrying(hrs):   Weight Limit(lb):   Lifting(hrs):   Weight  Limit(lb):     Comments:      Repetitive Actions   Hands: i.e. Fine Manipulations from Grasping:     Feet: i.e. Operating Foot Controls:     Driving / Operate Machinery:     Health Care Provider’s Original or Electronic Signature  Derek Gutiérrez D.O. [facility]  Provider’s Original or Electronic Signature    Derek Gutiérrez DO MPH     Clinic Name / Location: [address],   [address]  [place] [address] Clinic Phone Number: Dept: [phone]   Appointment Time: 10:30 Am Visit Start Time: 10:50 AM   Check-In Time:  10:43 Am Visit Discharge Time:  11:12AM   Original-Treating Physician or Chiropractor    Page 2-Insurer/TPA    Page 3-Employer    Page 4-Employee

## 2023-10-12 ENCOUNTER — APPOINTMENT (OUTPATIENT)
Dept: PHYSICAL THERAPY | Facility: REHABILITATION | Age: 53
End: 2023-10-12
Attending: PREVENTIVE MEDICINE
Payer: COMMERCIAL

## 2023-10-13 ENCOUNTER — APPOINTMENT (OUTPATIENT)
Dept: MEDICAL GROUP | Facility: PHYSICIAN GROUP | Age: 53
End: 2023-10-13
Payer: COMMERCIAL

## 2023-10-27 ENCOUNTER — PHYSICAL THERAPY (OUTPATIENT)
Dept: PHYSICAL THERAPY | Facility: REHABILITATION | Age: 53
End: 2023-10-27
Attending: PREVENTIVE MEDICINE
Payer: COMMERCIAL

## 2023-10-27 DIAGNOSIS — M25.572 LEFT LATERAL ANKLE PAIN: ICD-10-CM

## 2023-10-27 PROCEDURE — 97140 MANUAL THERAPY 1/> REGIONS: CPT

## 2023-10-27 PROCEDURE — 97014 ELECTRIC STIMULATION THERAPY: CPT

## 2023-10-27 NOTE — OP THERAPY DAILY TREATMENT
Outpatient Physical Therapy  DAILY TREATMENT     [facility] Physical [address].  [address]  [place] [address]  Phone:  [phone]  Fax:  [phone]    Date: 10/27/2023    Patient: Gabriela Carrillo  YOB: 1970  MRN: [id number]     Time Calculation    Start time: 0200  Stop time: 0300 Time Calculation (min): 60 minutes         Chief Complaint: No chief complaint on file.    Visit #: 3    SUBJECTIVE:  Patient reports  that she is able to walk wihtout pain for 1 hour but sore upon finish  OBJECTIVE:            Therapeutic Exercises (CPT 04656):       Therapeutic Exercise Summary: Iastm : fl,ant tib, e, FL,  Reviewed HEP   Cupping ant. . FL, EHL gait trg.   Russian 5/5 with inv/ev balloon smash  #1 band x 15' mhp      Therapeutic Treatments and Modalities:     1. Manual Therapy (CPT 51622), see below    2. Gait Training (CPT 26471), see below    Therapeutic Treatment and Modalities Summary: MT: IASTM L tib and, fib fanning, lifting and hooking, mod fascial restrictions noted & erythema. Cupping applied to tib ant and fib compartment to inc blood and decrease fascial restrictions to improve motor recruitment with gait.  In supine Talocrural jt ant/post mobs grade 3-4 to inc DF, Subtalar med/lat jt mobs grade 3-4 to inc ankle EV.  Gait training: pre/post MT Demo wide MILEY, L tibial ER for decreased loading with decreased DF.  Post MT: cued for heel toe/heel strike off great toe, focus on neutral foot alignment, demo ability to maintain neutral tibial alignment and push off from great toe       Time-based treatments/modalities:    Physical Therapy Timed Treatment Charges  Manual therapy minutes (CPT 02251): 25 minutes  Therapeutic exercise minutes (CPT 14595): 5 minutes      ASSESSMENT:   Improving strength and tolerance to activity and walking--ttp fl, atn tib.       PLAN/RECOMMENDATIONS:   DN to fl, ant tiob, psot tib, progress balance and eccentrics

## 2023-11-02 ENCOUNTER — APPOINTMENT (OUTPATIENT)
Dept: PHYSICAL THERAPY | Facility: REHABILITATION | Age: 53
End: 2023-11-02
Attending: PREVENTIVE MEDICINE
Payer: COMMERCIAL

## 2023-11-09 ENCOUNTER — APPOINTMENT (OUTPATIENT)
Dept: PHYSICAL THERAPY | Facility: REHABILITATION | Age: 53
End: 2023-11-09
Attending: PREVENTIVE MEDICINE
Payer: COMMERCIAL

## 2023-11-13 ENCOUNTER — OCCUPATIONAL MEDICINE (OUTPATIENT)
Dept: OCCUPATIONAL MEDICINE | Facility: CLINIC | Age: 53
End: 2023-11-13
Payer: COMMERCIAL

## 2023-11-13 VITALS
OXYGEN SATURATION: 97 % | BODY MASS INDEX: 33.38 KG/M2 | DIASTOLIC BLOOD PRESSURE: 80 MMHG | HEART RATE: 103 BPM | RESPIRATION RATE: 16 BRPM | HEIGHT: 60 IN | WEIGHT: 170 LBS | TEMPERATURE: 97 F | SYSTOLIC BLOOD PRESSURE: 128 MMHG

## 2023-11-13 DIAGNOSIS — S93.402D MODERATE LEFT ANKLE SPRAIN, SUBSEQUENT ENCOUNTER: ICD-10-CM

## 2023-11-13 PROCEDURE — 3074F SYST BP LT 130 MM HG: CPT | Performed by: PREVENTIVE MEDICINE

## 2023-11-13 PROCEDURE — 3079F DIAST BP 80-89 MM HG: CPT | Performed by: PREVENTIVE MEDICINE

## 2023-11-13 PROCEDURE — 99213 OFFICE O/P EST LOW 20 MIN: CPT | Performed by: PREVENTIVE MEDICINE

## 2023-11-13 ASSESSMENT — FIBROSIS 4 INDEX: FIB4 SCORE: 1.26

## 2023-11-13 ASSESSMENT — ENCOUNTER SYMPTOMS
TINGLING: 0
SENSORY CHANGE: 0

## 2023-11-13 NOTE — LETTER
[address],   [address] [place] [address]  Phone:  [phone] - Fax:  [phone]   Occupational Health [facility] Progress Report and Disability Certification  Date of Service: 11/13/2023   No Show:  No  Date / Time of Next Visit: 12/18/2023 10:30 AM    Claim Information   Patient Name: Gabriela Carrillo  Claim Number:     Employer: [employer]  Date of Injury: 5/23/2023     Insurer / TPA: [employer]  ID / SSN:     Occupation: [employer]  Diagnosis: The encounter diagnosis was Moderate left ankle sprain, subsequent encounter.    Medical Information   Related to Industrial Injury? Yes    Subjective Complaints:  DOI: 5/23/2023: 53-year-old injured worker presents with left ankle and head injury. BRAYAN: Rolled ankle off a step at work causing injury to left ankle.  During the fall, hit left side of head on a metal box.  X-rays of the left ankle and foot were negative for acute findings.     8/23/2023: Patient states that overall symptoms are more or less the same.  She does have little less swelling.  But is still persistent on the lateral aspect of the ankle.  She states that she is walking tennis shoes now which do seem to help more.  She states that she is able to get the MRI scheduled for September 2.  She also has physical therapy scheduled begin September 14.  She is doing the home exercises that were prescribed which do seem to help somewhat.     9/7/2023: Symptoms more or less the same.  Here for MRI results.  Starts physical therapy next week.     10/11/2023: Patient states overall symptoms have improved little bit.  She states this has not been less swelling than before.  She has started physical therapy and has attended 2 sessions.  She feels like it has been helpful.  However she still has difficulty with any prolonged standing or walking.  Does not feel ready to be standing and walking on her feet for 12 hours/day.  Has physical therapy  visit scheduled once per week for the next several weeks.    11/13/2023: Patient states that overall symptoms are little bit improved.  She states she can walk a bit more but does continue significant pain and swelling.  She states that last week she woke up and the ankle was black and blue.  She still does get some instability as well.   Objective Findings: Left ankle: Minimal swelling over the lateral malleolus.  Tenderness over the ATFL, CFL and PT FL.  Minimal tenderness over the Achilles tendon.  No medial ankle tenderness.  Full range of motion mild discomfort.  Antalgic gait.     MRI Ankle:  Intermediate grade sprains of the anterior talofibular, posterior talofibular, and calcaneofibular ligaments. Low-grade sprain of the anterior distal tibiofibular syndesmotic ligament.  2. Mild peroneal tenosynovitis with a questionable short-segment split tear of the peroneus brevis tendon seen on a single slice.  3. Likely reactive marrow edema within the posterior aspect of the lateral malleolus without fracture line.     Pre-Existing Condition(s):     Assessment:   Condition Same    Status: Additional Care Required  Permanent Disability:No    Plan:      Diagnostics:      Comments:  Given duration of symptoms despite conservative management will refer to orthopedics  Continue physical therapy  Continue to increase walking as tolerated  Continue range of motion exercises as demonstrated  OTC ibuprofen/Tylenol as needed  Ice 2-3 times per day as needed  Restricted duty  Follow-up 4 weeks        Disability Information   Status: Released to Restricted Duty    From:  11/13/2023  Through: 12/18/2023 Restrictions are: Temporary   Physical Restrictions   Sitting:    Standing:  < or = to 6 hrs/day Stooping:    Bending:      Squatting:    Walking:  < or = to 6 hrs/day Climbing:    Pushing:      Pulling:    Other:    Reaching Above Shoulder (L):   Reaching Above Shoulder (R):       Reaching Below Shoulder (L):    Reaching Below  Shoulder (R):      Not to exceed Weight Limits   Carrying(hrs):   Weight Limit(lb):   Lifting(hrs):   Weight  Limit(lb):     Comments: Seated work 50% shift    Repetitive Actions   Hands: i.e. Fine Manipulations from Grasping:     Feet: i.e. Operating Foot Controls:     Driving / Operate Machinery:     Health Care Provider’s Original or Electronic Signature  Derek Gutiérrez D.O. Health Care Provider’s Original or Electronic Signature    Derek Gutiérrez DO MPH     Clinic Name / Location: [address],   [address] [address] Clinic Phone Number: Dept: [phone]   Appointment Time: 10:30 Am Visit Start Time: 10:32 AM   Check-In Time:  10:14 Am Visit Discharge Time:  10:49 AM    Original-Treating Physician or Chiropractor    Page 2-Insurer/TPA    Page 3-Employer    Page 4-Employee

## 2023-11-13 NOTE — PROGRESS NOTES
Subjective:     Gabriela Carrillo is a 53 y.o. female who presents for Follow-Up (RM 16)      DOI: 5/23/2023: 53-year-old injured worker presents with left ankle and head injury. BRAYAN: Rolled ankle off a step at work causing injury to left ankle.  During the fall, hit left side of head on a metal box.  X-rays of the left ankle and foot were negative for acute findings.     8/23/2023: Patient states that overall symptoms are more or less the same.  She does have little less swelling.  But is still persistent on the lateral aspect of the ankle.  She states that she is walking tennis shoes now which do seem to help more.  She states that she is able to get the MRI scheduled for September 2.  She also has physical therapy scheduled begin September 14.  She is doing the home exercises that were prescribed which do seem to help somewhat.     9/7/2023: Symptoms more or less the same.  Here for MRI results.  Starts physical therapy next week.     10/11/2023: Patient states overall symptoms have improved little bit.  She states this has not been less swelling than before.  She has started physical therapy and has attended 2 sessions.  She feels like it has been helpful.  However she still has difficulty with any prolonged standing or walking.  Does not feel ready to be standing and walking on her feet for 12 hours/day.  Has physical therapy visit scheduled once per week for the next several weeks.    11/13/2023: Patient states that overall symptoms are little bit improved.  She states she can walk a bit more but does continue significant pain and swelling.  She states that last week she woke up and the ankle was black and blue.  She still does get some instability as well.    Review of Systems   Neurological:  Negative for tingling and sensory change.       SOCHX: Works as a  at [id number]  FH: No pertinent family history to this problem.       Objective:     /80   Pulse (!) 103   Temp 36.1 °C (97  °F) (Temporal)   Resp 16   Ht 1.524 m (5')   Wt 77.1 kg (170 lb)   SpO2 97%   BMI 33.20 kg/m²     Constitutional: Patient is in no acute distress. Appears well-developed and well-nourished.   Cardiovascular: Normal rate.    Pulmonary/Chest: Effort normal. No respiratory distress.   Neurological: Patient is alert and oriented to person, place, and time.   Skin: Skin is warm and dry.   Psychiatric: Normal mood and affect. Behavior is normal.     Left ankle: Minimal swelling over the lateral malleolus.  Tenderness over the ATFL, CFL and PT FL.  Minimal tenderness over the Achilles tendon.  No medial ankle tenderness.  Full range of motion mild discomfort.  Antalgic gait.     MRI Ankle:  Intermediate grade sprains of the anterior talofibular, posterior talofibular, and calcaneofibular ligaments. Low-grade sprain of the anterior distal tibiofibular syndesmotic ligament.  2. Mild peroneal tenosynovitis with a questionable short-segment split tear of the peroneus brevis tendon seen on a single slice.  3. Likely reactive marrow edema within the posterior aspect of the lateral malleolus without fracture line.      Assessment/Plan:       1. Moderate left ankle sprain, subsequent encounter  - Referral to Orthopedics    Released to Restricted Duty FROM 11/13/2023 TO 12/18/2023  Seated work 50% shift    Given duration of symptoms despite conservative management will refer to orthopedics  Continue physical therapy  Continue to increase walking as tolerated  Continue range of motion exercises as demonstrated  OTC ibuprofen/Tylenol as needed  Ice 2-3 times per day as needed  Restricted duty  Follow-up 4 weeks        Differential diagnosis, natural history, supportive care, and indications for immediate follow-up discussed.    Approximately 25 minutes was spent in preparing for visit, obtaining history, exam and evaluation, patient counseling/education and post visit documentation/orders.

## 2023-12-18 ENCOUNTER — OCCUPATIONAL MEDICINE (OUTPATIENT)
Dept: OCCUPATIONAL MEDICINE | Facility: CLINIC | Age: 53
End: 2023-12-18
Payer: COMMERCIAL

## 2023-12-18 VITALS
HEART RATE: 80 BPM | SYSTOLIC BLOOD PRESSURE: 130 MMHG | WEIGHT: 168.6 LBS | BODY MASS INDEX: 33.1 KG/M2 | HEIGHT: 60 IN | DIASTOLIC BLOOD PRESSURE: 90 MMHG | OXYGEN SATURATION: 95 % | TEMPERATURE: 96.5 F | RESPIRATION RATE: 16 BRPM

## 2023-12-18 DIAGNOSIS — S93.402D MODERATE LEFT ANKLE SPRAIN, SUBSEQUENT ENCOUNTER: ICD-10-CM

## 2023-12-18 PROCEDURE — 3080F DIAST BP >= 90 MM HG: CPT | Performed by: PREVENTIVE MEDICINE

## 2023-12-18 PROCEDURE — 3075F SYST BP GE 130 - 139MM HG: CPT | Performed by: PREVENTIVE MEDICINE

## 2023-12-18 PROCEDURE — 99213 OFFICE O/P EST LOW 20 MIN: CPT | Performed by: PREVENTIVE MEDICINE

## 2023-12-18 ASSESSMENT — FIBROSIS 4 INDEX: FIB4 SCORE: 1.26

## 2023-12-18 NOTE — PROGRESS NOTES
Subjective:     Gabriela Carrillo is a 53 y.o. female who presents for Follow-Up ( - Follow Up - Rm 19)      DOI: 5/23/2023: 53-year-old injured worker presents with left ankle and head injury. BRAYAN: Rolled ankle off a step at work causing injury to left ankle.  During the fall, hit left side of head on a metal box.  X-rays of the left ankle and foot were negative for acute findings.     8/23/2023: Patient states that overall symptoms are more or less the same.  She does have little less swelling.  But is still persistent on the lateral aspect of the ankle.  She states that she is walking tennis shoes now which do seem to help more.  She states that she is able to get the MRI scheduled for September 2.  She also has physical therapy scheduled begin September 14.  She is doing the home exercises that were prescribed which do seem to help somewhat.     9/7/2023: Symptoms more or less the same.  Here for MRI results.  Starts physical therapy next week.     10/11/2023: Patient states overall symptoms have improved little bit.  She states this has not been less swelling than before.  She has started physical therapy and has attended 2 sessions.  She feels like it has been helpful.  However she still has difficulty with any prolonged standing or walking.  Does not feel ready to be standing and walking on her feet for 12 hours/day.  Has physical therapy visit scheduled once per week for the next several weeks.     11/13/2023: Patient states that overall symptoms are little bit improved.  She states she can walk a bit more but does continue significant pain and swelling.  She states that last week she woke up and the ankle was black and blue.  She still does get some instability as well.    12/18/2023: Symptoms are the same.  Continues to have instability and pain with prolonged standing or walking.  Especially going up and down stairs.  Has an appointment with [facility] tomorrow.    NURA RENOX: Works  as a  at [employer]  FH: No pertinent family history to this problem.       Objective:     BP (!) 130/90 (BP Location: Right arm, Patient Position: Sitting, BP Cuff Size: Adult)   Pulse 80   Temp 35.8 °C (96.5 °F) (Temporal)   Resp 16   Ht 1.524 m (5')   Wt 76.5 kg (168 lb 9.6 oz)   SpO2 95%   BMI 32.93 kg/m²     Constitutional: Patient is in no acute distress. Appears well-developed and well-nourished.   Cardiovascular: Normal rate.    Pulmonary/Chest: Effort normal. No respiratory distress.   Neurological: Patient is alert and oriented to person, place, and time.   Skin: Skin is warm and dry.   Psychiatric: Normal mood and affect. Behavior is normal.     Left ankle: Minimal swelling over the lateral malleolus.  Tenderness over the ATFL, CFL and PT FL.  Minimal tenderness over the Achilles tendon.  No medial ankle tenderness.  Full range of motion mild discomfort.  Antalgic gait.     MRI Ankle:  Intermediate grade sprains of the anterior talofibular, posterior talofibular, and calcaneofibular ligaments. Low-grade sprain of the anterior distal tibiofibular syndesmotic ligament.  2. Mild peroneal tenosynovitis with a questionable short-segment split tear of the peroneus brevis tendon seen on a single slice.  3. Likely reactive marrow edema within the posterior aspect of the lateral malleolus without fracture line.      Assessment/Plan:       1. Moderate left ankle sprain, subsequent encounter    Released to Restricted Duty FROM 12/18/2023 TO    Seated work 50% shift     Continue care with orthopedics  Continue to increase walking as tolerated  Continue range of motion exercises as demonstrated  OTC ibuprofen/Tylenol as needed  Ice 2-3 times per day as needed  Restricted duty  Follow-up as needed      Differential diagnosis, natural history, supportive care, and indications for immediate follow-up discussed.    Approximately 25 minutes was spent in preparing for visit, obtaining history, exam and  evaluation, patient counseling/education and post visit documentation/orders.

## 2023-12-18 NOTE — LETTER
[address],   [address] [address]  Phone:  [phone] - Fax:  [phone]   Occupational Health [facility] Progress Report and Disability Certification  Date of Service: 12/18/2023   No Show:  No  Date / Time of Next Visit:  IDALIA ORtho   Claim Information   Patient Name: Gabriela Carrillo  Claim Number:     Employer: [employer]  Date of Injury: 5/23/2023     Insurer / TPA: [employer]  ID / SSN:     Occupation:   Diagnosis: The encounter diagnosis was Moderate left ankle sprain, subsequent encounter.    Medical Information   Related to Industrial Injury? Yes    Subjective Complaints:  DOI: 5/23/2023: 53-year-old injured worker presents with left ankle and head injury. BRAYAN: Rolled ankle off a step at work causing injury to left ankle.  During the fall, hit left side of head on a metal box.  X-rays of the left ankle and foot were negative for acute findings.     8/23/2023: Patient states that overall symptoms are more or less the same.  She does have little less swelling.  But is still persistent on the lateral aspect of the ankle.  She states that she is walking tennis shoes now which do seem to help more.  She states that she is able to get the MRI scheduled for September 2.  She also has physical therapy scheduled begin September 14.  She is doing the home exercises that were prescribed which do seem to help somewhat.     9/7/2023: Symptoms more or less the same.  Here for MRI results.  Starts physical therapy next week.     10/11/2023: Patient states overall symptoms have improved little bit.  She states this has not been less swelling than before.  She has started physical therapy and has attended 2 sessions.  She feels like it has been helpful.  However she still has difficulty with any prolonged standing or walking.  Does not feel ready to be standing and walking on her feet for 12 hours/day.  Has physical therapy visit  scheduled once per week for the next several weeks.     11/13/2023: Patient states that overall symptoms are little bit improved.  She states she can walk a bit more but does continue significant pain and swelling.  She states that last week she woke up and the ankle was black and blue.  She still does get some instability as well.    12/18/2023: Symptoms are the same.  Continues to have instability and pain with prolonged standing or walking.  Especially going up and down stairs.  Has an appointment with [facility] tomorrow.   Objective Findings: Left ankle: Minimal swelling over the lateral malleolus.  Tenderness over the ATFL, CFL and PT FL.  Minimal tenderness over the Achilles tendon.  No medial ankle tenderness.  Full range of motion mild discomfort.  Antalgic gait.     MRI Ankle:  Intermediate grade sprains of the anterior talofibular, posterior talofibular, and calcaneofibular ligaments. Low-grade sprain of the anterior distal tibiofibular syndesmotic ligament.  2. Mild peroneal tenosynovitis with a questionable short-segment split tear of the peroneus brevis tendon seen on a single slice.  3. Likely reactive marrow edema within the posterior aspect of the lateral malleolus without fracture line.     Pre-Existing Condition(s):     Assessment:   Condition Same    Status: Discharged / Care Transfer  Permanent Disability:No    Plan:      Diagnostics:      Comments:  Continue care with orthopedics  Continue to increase walking as tolerated  Continue range of motion exercises as demonstrated  OTC ibuprofen/Tylenol as needed  Ice 2-3 times per day as needed  Restricted duty  Follow-up as needed      Disability Information   Status: Released to Restricted Duty    From:  12/18/2023  Through:   Restrictions are: Temporary   Physical Restrictions   Sitting:    Standing:  < or = to 6 hrs/day Stooping:    Bending:      Squatting:    Walking:  < or = to 6 hrs/day Climbing:    Pushing:      Pulling:     Other:    Reaching Above Shoulder (L):   Reaching Above Shoulder (R):       Reaching Below Shoulder (L):    Reaching Below Shoulder (R):      Not to exceed Weight Limits   Carrying(hrs):   Weight Limit(lb):   Lifting(hrs):   Weight  Limit(lb):     Comments: Seated work 50% shift     Repetitive Actions   Hands: i.e. Fine Manipulations from Grasping:     Feet: i.e. Operating Foot Controls:     Driving / Operate Machinery:     Health Care Provider’s Original or Electronic Signature  Derek Gutiérrez D.O. Health Care Provider’s Original or Electronic Signature    Derek Gutiérrez DO MPH     Clinic Name / Location: [address],   [address]  [place] [address] Clinic Phone Number: Dept: [phone]   Appointment Time: 10:30 Am Visit Start Time: 10:11 AM   Check-In Time:  10:05 Am Visit Discharge Time:  10:39 AM   Original-Treating Physician or Chiropractor    Page 2-Insurer/TPA    Page 3-Employer    Page 4-Employee

## 2023-12-19 PROBLEM — M25.572 ACUTE LEFT ANKLE PAIN: Status: ACTIVE | Noted: 2023-12-19

## 2024-01-05 DIAGNOSIS — Z87.81 HISTORY OF PELVIC FRACTURE: ICD-10-CM

## 2024-01-05 DIAGNOSIS — Z78.0 POST-MENOPAUSAL: ICD-10-CM

## 2024-01-05 DIAGNOSIS — I10 PRIMARY HYPERTENSION: ICD-10-CM

## 2024-01-05 RX ORDER — OLMESARTAN MEDOXOMIL 20 MG/1
20 TABLET ORAL DAILY
Qty: 90 TABLET | Refills: 3 | Status: SHIPPED | OUTPATIENT
Start: 2024-01-05 | End: 2024-01-10 | Stop reason: SDUPTHER

## 2024-01-05 RX ORDER — ESCITALOPRAM OXALATE 10 MG/1
10 TABLET ORAL DAILY
Qty: 90 TABLET | Refills: 3 | Status: SHIPPED | OUTPATIENT
Start: 2024-01-05

## 2024-01-05 RX ORDER — IBUPROFEN 800 MG/1
800 TABLET ORAL EVERY 8 HOURS PRN
Qty: 30 TABLET | Refills: 0 | Status: SHIPPED | OUTPATIENT
Start: 2024-01-05

## 2024-01-10 DIAGNOSIS — Z87.81 HISTORY OF PELVIC FRACTURE: ICD-10-CM

## 2024-01-10 DIAGNOSIS — I10 PRIMARY HYPERTENSION: ICD-10-CM

## 2024-01-10 RX ORDER — OLMESARTAN MEDOXOMIL 20 MG/1
20 TABLET ORAL DAILY
Qty: 90 TABLET | Refills: 0 | Status: SHIPPED | OUTPATIENT
Start: 2024-01-10

## 2024-04-24 ENCOUNTER — OFFICE VISIT (OUTPATIENT)
Dept: URGENT CARE | Facility: PHYSICIAN GROUP | Age: 54
End: 2024-04-24

## 2024-04-24 VITALS
SYSTOLIC BLOOD PRESSURE: 122 MMHG | HEART RATE: 85 BPM | WEIGHT: 168.8 LBS | DIASTOLIC BLOOD PRESSURE: 82 MMHG | RESPIRATION RATE: 12 BRPM | OXYGEN SATURATION: 98 % | TEMPERATURE: 97.8 F | HEIGHT: 60 IN | BODY MASS INDEX: 33.14 KG/M2

## 2024-04-24 DIAGNOSIS — K14.8 TONGUE LESION: ICD-10-CM

## 2024-04-24 PROCEDURE — 3074F SYST BP LT 130 MM HG: CPT | Performed by: NURSE PRACTITIONER

## 2024-04-24 PROCEDURE — 99213 OFFICE O/P EST LOW 20 MIN: CPT | Performed by: NURSE PRACTITIONER

## 2024-04-24 PROCEDURE — 3079F DIAST BP 80-89 MM HG: CPT | Performed by: NURSE PRACTITIONER

## 2024-04-24 RX ORDER — LIDOCAINE HYDROCHLORIDE 20 MG/ML
SOLUTION OROPHARYNGEAL
Qty: 100 ML | Refills: 0 | Status: SHIPPED | OUTPATIENT
Start: 2024-04-24

## 2024-04-24 RX ORDER — AMOXICILLIN AND CLAVULANATE POTASSIUM 875; 125 MG/1; MG/1
1 TABLET, FILM COATED ORAL 2 TIMES DAILY
Qty: 20 TABLET | Refills: 0 | Status: SHIPPED | OUTPATIENT
Start: 2024-04-24 | End: 2024-05-04

## 2024-04-24 ASSESSMENT — FIBROSIS 4 INDEX: FIB4 SCORE: 1.28

## 2024-04-25 NOTE — PROGRESS NOTES
Date: 04/24/24        Chief Complaint   Patient presents with    Tongue Swelling     Sore on tongue, painful/tender, x4 days        History of Present Illness: 54 y.o.  female presents to clinic with 4-day history of a painful sore on her tongue.  Patient states she has never had a sore like this previously.  She states she does get canker sores sometimes but this is much larger.  She denies any trauma to the tongue.  She denies any other symptoms.  No nausea vomiting diarrhea.  No fever body aches.  No abdominal pain.  No recent antibiotics.      ROS:    No severe shortness of breath   No Cardiac like chest pain, as discussed   As otherwise stated in HPI    Medical/SX/ Social History:  Reviewed per chart    Pertinent Medications:    Current Outpatient Medications on File Prior to Visit   Medication Sig Dispense Refill    acetaminophen (TYLENOL) 500 MG Tab Take 2 Tablets by mouth 3 times a day. 90 Tablet 0    hydrOXYzine pamoate (VISTARIL) 25 MG Cap Take 1 Capsule by mouth 3 times a day as needed for Itching or Other (nausea or insomnia). 20 Capsule 0    ibuprofen (MOTRIN) 600 MG Tab Take 1 Tablet by mouth every 8 hours as needed for Moderate Pain or Inflammation. 42 Tablet 0    hydrOXYzine pamoate (VISTARIL) 25 MG Cap TAKE ONE CAPSULE BY MOUTH THREE TIMES A DAY AS NEEDED FOR ITCHING OR OTHER (NAUSEA OR INSOMINA) 20 Capsule 0    gabapentin (NEURONTIN) 300 MG Cap Take 1 Capsule by mouth 3 times a day. 90 Capsule 0    meloxicam (MOBIC) 15 MG tablet Take 1 Tablet by mouth every day. 30 Tablet 2    gabapentin (NEURONTIN) 300 MG Cap Take 1 Capsule by mouth 3 times a day. 90 Capsule 0    olmesartan (BENICAR) 20 MG Tab Take 1 Tablet by mouth every day. 90 Tablet 0    escitalopram (LEXAPRO) 10 MG Tab Take 1 Tablet by mouth every day. 90 Tablet 3    ibuprofen (MOTRIN) 800 MG Tab Take 1 Tablet by mouth every 8 hours as needed for Moderate Pain. 30 Tablet 0    acetaminophen (TYLENOL) 500 MG Tab Take 2 Tablets by mouth 3 times  a day. 90 Tablet 0    gabapentin (NEURONTIN) 100 MG Cap Take 1 Capsule by mouth 3 times a day. 42 Capsule 0    hydrochlorothiazide (MICROZIDE) 12.5 MG capsule Take 1 Capsule by mouth every day. 90 Capsule 2    alendronate (FOSAMAX) 70 MG Tab Take 1 Tablet by mouth every 7 days. 12 Tablet 2    acetaminophen (TYLENOL) 500 MG Tab Take 500-1,000 mg by mouth every 6 hours as needed.      Calcium Carbonate-Vit D-Min (CALCIUM 1200 PO) Take  by mouth.      Cyanocobalamin (VITAMIN B 12 PO) Take  by mouth.      Ascorbic Acid (VITAMIN C) 1000 MG Tab Take  by mouth.       No current facility-administered medications on file prior to visit.        Allergies:    Patient has no known allergies.     Problem list, medications, and allergies reviewed by myself today in Epic     Physical Exam:    Vitals:    04/24/24 1721   BP:    Pulse:    Resp:    Temp: 36.6 °C (97.8 °F)   SpO2:              Physical Exam  Constitutional:       General: She is not in acute distress.     Appearance: Normal appearance. She is well-developed and normal weight. She is not ill-appearing, toxic-appearing or diaphoretic.   HENT:      Head: Normocephalic and atraumatic.      Mouth/Throat:      Lips: Pink.      Mouth: Mucous membranes are moist.      Pharynx: Oropharynx is clear.     Eyes:      General: Lids are normal. Gaze aligned appropriately. No allergic shiner or scleral icterus.     Extraocular Movements: Extraocular movements intact.      Conjunctiva/sclera: Conjunctivae normal.   Cardiovascular:      Rate and Rhythm: Normal rate and regular rhythm.      Pulses:           Radial pulses are 2+ on the right side and 2+ on the left side.      Heart sounds: Normal heart sounds.   Pulmonary:      Effort: Pulmonary effort is normal.      Breath sounds: Normal breath sounds and air entry. No decreased breath sounds, wheezing, rhonchi or rales.   Abdominal:      General: Abdomen is flat. Bowel sounds are normal.      Palpations: Abdomen is soft.       Tenderness: There is no abdominal tenderness.   Musculoskeletal:      Right lower leg: No edema.      Left lower leg: No edema.   Skin:     General: Skin is warm.      Capillary Refill: Capillary refill takes less than 2 seconds.      Coloration: Skin is not cyanotic or pale.   Neurological:      Mental Status: She is alert and oriented to person, place, and time.      Gait: Gait is intact.   Psychiatric:         Behavior: Behavior normal. Behavior is cooperative.              Medical Decision making and plan :  I personally reviewed prior external notes and test results pertinent to today's visit. Pt is clinically stable at today's acute urgent care visit.  Patient appears nontoxic with no acute distress noted. Appropriate for outpatient care at this time.      Pleasant 54 y.o. female presented clinic with a sore to the tongue that is consistent with a trauma to the tongue although patient denies none.  Not consistent with thrush, Griffin planus or leukoplakia.  Due to the BP red border do suspect likely secondary bacterial etiology.  We will cover with Augmentin.  Advised patient that if the sore does not resolve she is to follow-up with the oral surgeon for biopsy.    1. Tongue lesion    - amoxicillin-clavulanate (AUGMENTIN) 875-125 MG Tab; Take 1 Tablet by mouth 2 times a day for 10 days.  Dispense: 20 Tablet; Refill: 0  - lidocaine (XYLOCAINE) 2 % Solution; 5mL orally three times daily prn for 10 days, may be applied directly to surface of ulcers or used as a swish and spit up to three times daily as needed for oral ulcers  Dispense: 100 mL; Refill: 0       Shared decision-making was utilized with patient for treatment plan. Medication discussed included indication for use and the potential benefits and side effects. Education was provided regarding the aforementioned assessments.  Differential Diagnosis, natural history, and supportive care discussed. All of the patient's questions were answered to their  satisfaction at the time of discharge. Patient was encouraged to monitor symptoms closely. Those signs and symptoms which would warrant concern and mandate seeking a higher level of service through the emergency department discussed at length.  Patient stated agreement and understanding of this plan of care.    Disposition:  Home in stable condition       Voice Recognition Disclaimer:  Portions of this document were created using voice recognition software. The software does have a chance of producing errors of grammar and possibly content. I have made every reasonable attempt to correct obvious errors, but there may be errors of grammar and possibly content that I did not discover before finalizing the documentation.    [email or web address].

## 2024-05-21 DIAGNOSIS — I10 PRIMARY HYPERTENSION: ICD-10-CM

## 2024-05-21 DIAGNOSIS — Z87.81 HISTORY OF PELVIC FRACTURE: ICD-10-CM

## 2024-05-21 DIAGNOSIS — Z78.0 POST-MENOPAUSAL: ICD-10-CM

## 2024-05-22 RX ORDER — ESCITALOPRAM OXALATE 10 MG/1
10 TABLET ORAL DAILY
Qty: 90 TABLET | Refills: 0 | Status: SHIPPED | OUTPATIENT
Start: 2024-05-22

## 2024-05-22 RX ORDER — OLMESARTAN MEDOXOMIL 20 MG/1
20 TABLET ORAL DAILY
Qty: 90 TABLET | Refills: 0 | Status: SHIPPED | OUTPATIENT
Start: 2024-05-22

## 2024-05-22 NOTE — TELEPHONE ENCOUNTER
Received request via: Pharmacy    Was the patient seen in the last year in this department? No    Does the patient have an active prescription (recently filled or refills available) for medication(s) requested? No    Pharmacy Name: [facility] [address]    Does the patient have shelter Plus and need 100 day supply (blood pressure, diabetes and cholesterol meds only)? Patient does not have SCP

## 2024-05-23 NOTE — TELEPHONE ENCOUNTER
Alert-The patient is alert, awake and responds to voice. The patient is oriented to time, place, and person. The triage nurse is able to obtain subjective information. Received request via: Pharmacy    Was the patient seen in the last year in this department? No LAST SEEN 02/28/2023    Does the patient have an active prescription (recently filled or refills available) for medication(s) requested? No    Pharmacy Name: [facility]    Does the patient have [facility] Plus and need 100 day supply (blood pressure, diabetes and cholesterol meds only)? Patient does not have SCP

## 2024-06-29 DIAGNOSIS — I10 PRIMARY HYPERTENSION: ICD-10-CM

## 2024-06-29 DIAGNOSIS — Z87.81 HISTORY OF PELVIC FRACTURE: ICD-10-CM

## 2024-06-29 DIAGNOSIS — Z78.0 POST-MENOPAUSAL: ICD-10-CM

## 2024-07-01 RX ORDER — OLMESARTAN MEDOXOMIL 20 MG/1
20 TABLET ORAL DAILY
Qty: 30 TABLET | Refills: 0 | Status: SHIPPED | OUTPATIENT
Start: 2024-07-01

## 2024-07-01 RX ORDER — ESCITALOPRAM OXALATE 10 MG/1
10 TABLET ORAL DAILY
Qty: 30 TABLET | Refills: 0 | Status: SHIPPED | OUTPATIENT
Start: 2024-07-01

## 2024-08-02 DIAGNOSIS — Z78.0 POST-MENOPAUSAL: ICD-10-CM

## 2024-08-05 RX ORDER — ESCITALOPRAM OXALATE 10 MG/1
10 TABLET ORAL DAILY
Qty: 30 TABLET | Refills: 0 | Status: SHIPPED | OUTPATIENT
Start: 2024-08-05

## 2024-08-05 NOTE — TELEPHONE ENCOUNTER
Received request via: Pharmacy    Was the patient seen in the last year in this department? Yes    Does the patient have an active prescription (recently filled or refills available) for medication(s) requested? No    Pharmacy Name: [facility] [address]    Does the patient have custodial Plus and need 100 day supply (blood pressure, diabetes and cholesterol meds only)? Patient does not have SCP

## 2024-08-11 ENCOUNTER — HOSPITAL ENCOUNTER (EMERGENCY)
Facility: MEDICAL CENTER | Age: 54
End: 2024-08-11
Payer: COMMERCIAL

## 2024-08-11 ENCOUNTER — OFFICE VISIT (OUTPATIENT)
Dept: URGENT CARE | Facility: PHYSICIAN GROUP | Age: 54
End: 2024-08-11
Payer: COMMERCIAL

## 2024-08-11 ENCOUNTER — HOSPITAL ENCOUNTER (EMERGENCY)
Facility: MEDICAL CENTER | Age: 54
End: 2024-08-11
Attending: EMERGENCY MEDICINE
Payer: COMMERCIAL

## 2024-08-11 ENCOUNTER — APPOINTMENT (OUTPATIENT)
Dept: RADIOLOGY | Facility: MEDICAL CENTER | Age: 54
End: 2024-08-11
Attending: EMERGENCY MEDICINE
Payer: COMMERCIAL

## 2024-08-11 VITALS
HEIGHT: 61 IN | DIASTOLIC BLOOD PRESSURE: 59 MMHG | BODY MASS INDEX: 30.02 KG/M2 | WEIGHT: 159 LBS | TEMPERATURE: 97.5 F | HEART RATE: 61 BPM | SYSTOLIC BLOOD PRESSURE: 113 MMHG | RESPIRATION RATE: 18 BRPM | OXYGEN SATURATION: 95 %

## 2024-08-11 VITALS
TEMPERATURE: 97.1 F | DIASTOLIC BLOOD PRESSURE: 80 MMHG | SYSTOLIC BLOOD PRESSURE: 110 MMHG | OXYGEN SATURATION: 95 % | BODY MASS INDEX: 27.38 KG/M2 | RESPIRATION RATE: 16 BRPM | HEART RATE: 64 BPM | WEIGHT: 145 LBS | HEIGHT: 61 IN

## 2024-08-11 DIAGNOSIS — S06.0X1A CONCUSSION WITH LOSS OF CONSCIOUSNESS OF 30 MINUTES OR LESS, INITIAL ENCOUNTER: ICD-10-CM

## 2024-08-11 DIAGNOSIS — R40.20 LOC (LOSS OF CONSCIOUSNESS) (HCC): ICD-10-CM
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
## 2024-08-11 DIAGNOSIS — S09.93XA FACIAL INJURY, INITIAL ENCOUNTER: ICD-10-CM

## 2024-08-11 DIAGNOSIS — S16.1XXA STRAIN OF NECK MUSCLE, INITIAL ENCOUNTER: ICD-10-CM

## 2024-08-11 PROCEDURE — 99214 OFFICE O/P EST MOD 30 MIN: CPT

## 2024-08-11 PROCEDURE — 99283 EMERGENCY DEPT VISIT LOW MDM: CPT

## 2024-08-11 PROCEDURE — 70486 CT MAXILLOFACIAL W/O DYE: CPT

## 2024-08-11 PROCEDURE — 700102 HCHG RX REV CODE 250 W/ 637 OVERRIDE(OP): Performed by: EMERGENCY MEDICINE

## 2024-08-11 PROCEDURE — 70450 CT HEAD/BRAIN W/O DYE: CPT

## 2024-08-11 PROCEDURE — 72125 CT NECK SPINE W/O DYE: CPT

## 2024-08-11 PROCEDURE — A9270 NON-COVERED ITEM OR SERVICE: HCPCS | Performed by: EMERGENCY MEDICINE

## 2024-08-11 RX ORDER — OXYCODONE AND ACETAMINOPHEN 5; 325 MG/1; MG/1
1 TABLET ORAL ONCE
Status: COMPLETED | OUTPATIENT
Start: 2024-08-11 | End: 2024-08-11

## 2024-08-11 RX ADMIN — OXYCODONE HYDROCHLORIDE AND ACETAMINOPHEN 1 TABLET: 5; 325 TABLET ORAL at 15:15

## 2024-08-11 ASSESSMENT — ENCOUNTER SYMPTOMS
FALLS: 1
HEADACHES: 1
NAUSEA: 0
LOSS OF CONSCIOUSNESS: 1
VOMITING: 0
DOUBLE VISION: 0

## 2024-08-11 ASSESSMENT — FIBROSIS 4 INDEX
FIB4 SCORE: 1.28
FIB4 SCORE: 1.28

## 2024-08-11 NOTE — ED NOTES
Dc instructions and medications discussed with patient at bedside. All questions answered at this time. VSS. No IV in place at this time. Pt to lobby without incident. Spouse to drive patient home.

## 2024-08-11 NOTE — ED NOTES
Pt requesting pain meds for HA, ERP verbal order to give Percocet 5/325 oral 1 time dose. Order placed.

## 2024-08-11 NOTE — PROGRESS NOTES
Verbal consent was acquired by the patient to use [employer] ambient listening note generation during this visit   Subjective:   Gabriela Carrillo is a 54 y.o. female who presents for Fall (Fall on face on cement, did loose conscious, headache. )      HPI:  History of Present Illness  The patient is a 54-year-old female who presents today for evaluation following a fall. She is accompanied by her .    She experienced a fall after tripping, resulting in a fall on her face on cement. This incident led to a loss of consciousness. Her  reports that she was unconscious for approximately 1 min. He described that patient's eyes rolling the back of her head and she lost consciousness for a minute. She attempted to lie down, felt hot and sweaty, and had a headache, but did not vomit. She is not on any blood thinners. Her vision is slightly impaired, and she rates her overall discomfort as an 8 out of 10. She is experiencing significant pain around her periorbital area. She reports pain during eye movements.  She denies feeling weak and has a good appetite.       Review of Systems   Eyes:  Negative for double vision.        +change in vision   Gastrointestinal:  Negative for nausea and vomiting.   Musculoskeletal:  Positive for falls.   Neurological:  Positive for loss of consciousness and headaches.       Medications:    Current Outpatient Medications on File Prior to Visit   Medication Sig Dispense Refill    escitalopram (LEXAPRO) 10 MG Tab Take 1 Tablet by mouth every day. 30 Tablet 0    hydrOXYzine pamoate (VISTARIL) 25 MG Cap TAKE 1 CAPSULE BY MOUTH THREE TIMES A DAY AS NEEDED FOR ITCHING OR OTHER (NAUSEA OR INSOMNIA) 20 Capsule 0    ibuprofen (MOTRIN) 600 MG Tab Take 1 Tablet by mouth every 8 hours as needed for Moderate Pain or Inflammation. 42 Tablet 0    gabapentin (NEURONTIN) 100 MG Cap Take 1 Capsule by mouth 2 times a day. 60 Capsule 0    olmesartan (BENICAR) 20 MG Tab Take 1 Tablet by mouth  every day. 30 Tablet 0    gabapentin (NEURONTIN) 300 MG Cap Take 1 Capsule by mouth 3 times a day. 90 Capsule 0    lidocaine (XYLOCAINE) 2 % Solution 5mL orally three times daily prn for 10 days, may be applied directly to surface of ulcers or used as a swish and spit up to three times daily as needed for oral ulcers 100 mL 0    acetaminophen (TYLENOL) 500 MG Tab Take 2 Tablets by mouth 3 times a day. 90 Tablet 0    hydrOXYzine pamoate (VISTARIL) 25 MG Cap TAKE ONE CAPSULE BY MOUTH THREE TIMES A DAY AS NEEDED FOR ITCHING OR OTHER (NAUSEA OR INSOMINA) 20 Capsule 0    meloxicam (MOBIC) 15 MG tablet Take 1 Tablet by mouth every day. 30 Tablet 2    gabapentin (NEURONTIN) 300 MG Cap Take 1 Capsule by mouth 3 times a day. 90 Capsule 0    ibuprofen (MOTRIN) 800 MG Tab Take 1 Tablet by mouth every 8 hours as needed for Moderate Pain. 30 Tablet 0    acetaminophen (TYLENOL) 500 MG Tab Take 2 Tablets by mouth 3 times a day. 90 Tablet 0    gabapentin (NEURONTIN) 100 MG Cap Take 1 Capsule by mouth 3 times a day. 42 Capsule 0    hydrochlorothiazide (MICROZIDE) 12.5 MG capsule Take 1 Capsule by mouth every day. 90 Capsule 2    alendronate (FOSAMAX) 70 MG Tab Take 1 Tablet by mouth every 7 days. 12 Tablet 2    acetaminophen (TYLENOL) 500 MG Tab Take 500-1,000 mg by mouth every 6 hours as needed.      Calcium Carbonate-Vit D-Min (CALCIUM 1200 PO) Take  by mouth.      Cyanocobalamin (VITAMIN B 12 PO) Take  by mouth.      Ascorbic Acid (VITAMIN C) 1000 MG Tab Take  by mouth.       No current facility-administered medications on file prior to visit.        Allergies:   Patient has no known allergies.    Problem List:   Patient Active Problem List   Diagnosis    Primary hypertension    Macrocytosis    Obesity (BMI 30-39.9)    History of pelvic fracture    Dyslipidemia    Multiple fractures of pelvis with unstable disruption of pelvic ring, subsequent encounter for fracture with nonunion    Osteopenia with high risk of fracture     "Post-menopausal    Acute left ankle pain        Surgical History:  Past Surgical History:   Procedure Laterality Date    PB REPAIR COLLAT ANKLE LIGMNT,SECONDARY  1/3/2024    Procedure: LEFT LATERAL LIGAMENT REPAIR;  Surgeon: Keshawn Cardoso M.D.;  Location: [facility];  Service: Orthopedics    PB OPEN TX DISTAL TIBIOFIBULAR JOINT DISRUPTION Left 1/3/2024    Procedure: LEFT SYNDESMOSIS OPEN REDUCTION INTERNAL FIXATION;  Surgeon: Keshawn Cardoso M.D.;  Location: [facility];  Service: Orthopedics    PB RELEASE TIB/FIB/ANKLE FLEX TENDON,EA Left 1/3/2024    Procedure: LEFT PERONEAL TENDON DEBRIDEMENT, REPAIRS AS INDICATED;  Surgeon: Keshawn Cardoso M.D.;  Location: [facility];  Service: Orthopedics    ANKLE ARTHROSCOPY Left 1/3/2024    Procedure: LEFT ANKLE ARTHROSCOPY;  Surgeon: Keshawn Cardoso M.D.;  Location: [facility];  Service: Orthopedics    PB OPEN INTERN FIX ANTER PELV RING FX Right 2/7/2023    Procedure: RIGHT PERCUTANEOUS FIXATION ANTERIOR PELVIC RING;  Surgeon: Geovanni Marcial M.D.;  Location: [facility];  Service: Orthopedics    GYN LAPAROSCOPY  2020       Past Social Hx:   Social History     Tobacco Use    Smoking status: Never    Smokeless tobacco: Never   Vaping Use    Vaping status: Never Used   Substance Use Topics    Alcohol use: Not Currently     Alcohol/week: 1.2 oz     Types: 2 Cans of beer per week     Comment: 2-4 a week    Drug use: Never          Problem list, medications, and allergies reviewed by myself today in Epic.     Objective:     /80 (BP Location: Left arm, Patient Position: Sitting, BP Cuff Size: Adult)   Pulse 64   Temp 36.2 °C (97.1 °F) (Temporal)   Resp 16   Ht 1.549 m (5' 1\")   Wt 65.8 kg (145 lb)   SpO2 95%   BMI 27.40 kg/m²     Physical Exam  Vitals and nursing note reviewed.   Constitutional:       General: She is not in acute distress.     " Appearance: Normal appearance. She is normal weight. She is not ill-appearing, toxic-appearing or diaphoretic.   HENT:      Head: Normocephalic. Contusion present. No Horne's sign.      Comments: Periorbital ecchymosis and swelling  Eyes:      Extraocular Movements: Extraocular movements intact.      Pupils: Pupils are equal, round, and reactive to light.   Cardiovascular:      Rate and Rhythm: Normal rate and regular rhythm.      Pulses: Normal pulses.      Heart sounds: Normal heart sounds. No murmur heard.     No friction rub. No gallop.   Pulmonary:      Effort: Pulmonary effort is normal. No respiratory distress.      Breath sounds: Normal breath sounds. No stridor. No wheezing, rhonchi or rales.   Chest:      Chest wall: No tenderness.   Skin:     General: Skin is warm and dry.      Capillary Refill: Capillary refill takes less than 2 seconds.   Neurological:      General: No focal deficit present.      Mental Status: She is alert and oriented to person, place, and time. Mental status is at baseline.      Cranial Nerves: No cranial nerve deficit.      Motor: No weakness.      Gait: Gait normal.   Psychiatric:         Mood and Affect: Mood normal.         Behavior: Behavior normal.         Thought Content: Thought content normal.         Judgment: Judgment normal.         Assessment/Plan:     Diagnosis and associated orders:   1. Fall, initial encounter        2. LOC (loss of consciousness) (HCC)        3. Facial injury, initial encounter               Comments/MDM:     Assessment & Plan  1. Fall with loss of consciousness.  She meets the trauma criteria for head CT.  I am referring the patient to the emergency room for a head scan and a CT scan of the face to rule out orbital fractures. A work note was provided for today and tomorrow.  Transfer center was called.  The patient's  will provide a ride to [facility]                 Please note that this dictation was created using voice recognition  software. I have made a reasonable attempt to correct obvious errors, but I expect that there are errors of grammar and possibly content that I did not discover before finalizing the note.    This note was electronically signed by WILL Pires

## 2024-08-11 NOTE — ED TRIAGE NOTES
Chief Complaint   Patient presents with    Head Injury     Trip and fall last night at approx 1930. Reports she hit head on cement and presents with ecchymosis to L orbital area. Reports also h/a and + LOC. Denies emesis or blood thinners.       Physical Exam  Pulmonary:      Effort: Pulmonary effort is normal.   Skin:     General: Skin is warm and dry.   Neurological:      Mental Status: She is alert.       Ambulates with steady gait independently.

## 2024-08-11 NOTE — LETTER
August 11, 2024    To Whom It May Concern:         This is confirmation that Gabriela Carrillo attended her scheduled appointment with SHON Fay on 8/11/24. Please excuse her from work 8/11/24-8/12/24.          If you have any questions please do not hesitate to call me at the phone number listed below.    Sincerely,          Savannah Cadet A.P.R.N.  [phone]

## 2024-09-03 ENCOUNTER — HOSPITAL ENCOUNTER (EMERGENCY)
Facility: MEDICAL CENTER | Age: 54
End: 2024-09-03
Attending: EMERGENCY MEDICINE
Payer: COMMERCIAL

## 2024-09-03 VITALS
OXYGEN SATURATION: 98 % | BODY MASS INDEX: 31.28 KG/M2 | DIASTOLIC BLOOD PRESSURE: 71 MMHG | TEMPERATURE: 97.3 F | RESPIRATION RATE: 18 BRPM | WEIGHT: 159.3 LBS | HEIGHT: 60 IN | HEART RATE: 100 BPM | SYSTOLIC BLOOD PRESSURE: 110 MMHG

## 2024-09-03 DIAGNOSIS — U07.1 COVID-19: ICD-10-CM

## 2024-09-03 LAB
FLUAV RNA SPEC QL NAA+PROBE: NEGATIVE
FLUBV RNA SPEC QL NAA+PROBE: NEGATIVE
RSV RNA SPEC QL NAA+PROBE: NEGATIVE
S PYO DNA SPEC NAA+PROBE: NOT DETECTED
SARS-COV-2 RNA RESP QL NAA+PROBE: DETECTED
SPECIMEN SOURCE: ABNORMAL

## 2024-09-03 PROCEDURE — 99284 EMERGENCY DEPT VISIT MOD MDM: CPT

## 2024-09-03 PROCEDURE — A9270 NON-COVERED ITEM OR SERVICE: HCPCS | Performed by: EMERGENCY MEDICINE

## 2024-09-03 PROCEDURE — 700102 HCHG RX REV CODE 250 W/ 637 OVERRIDE(OP): Performed by: EMERGENCY MEDICINE

## 2024-09-03 PROCEDURE — 0241U HCHG SARS-COV-2 COVID-19 NFCT DS RESP RNA 4 TRGT MIC: CPT

## 2024-09-03 PROCEDURE — 87651 STREP A DNA AMP PROBE: CPT

## 2024-09-03 RX ORDER — NAPROXEN 250 MG/1
500 TABLET ORAL ONCE
Status: COMPLETED | OUTPATIENT
Start: 2024-09-03 | End: 2024-09-03

## 2024-09-03 RX ADMIN — NAPROXEN SODIUM 500 MG: 250 TABLET ORAL at 13:36

## 2024-09-03 ASSESSMENT — FIBROSIS 4 INDEX: FIB4 SCORE: 1.28

## 2024-09-03 ASSESSMENT — PAIN DESCRIPTION - PAIN TYPE: TYPE: ACUTE PAIN

## 2024-09-03 NOTE — ED PROVIDER NOTES
ER Provider Note    Scribed for Jimy Lopez M.d. by Madi Fuller. 9/3/2024  11:53 AM    Primary Care Provider: Dhaval Donovan D.N.P.    CHIEF COMPLAINT  Chief Complaint   Patient presents with    Flu Like Symptoms     Pt states she got off work yesterday she felt a cold coming on. + sore throat, cough, body aches and diarrhea.      EXTERNAL RECORDS REVIEWED  Outpatient Notes Seen at urgent care Aug 2024 for fall with positive loss of consciousness, sent here for further care    HPI/ROS    LIMITATION TO HISTORY   Select: : None    Gabriela Carrillo is a 54 y.o. female who presents to the ED complaining of flu like symptoms onset one day ago. She states that yesterday afternoon she began to feel sore throat and runny nose. When she got home from work she felt terrible, woke up feeling similar. She currently endorses headache, diarrhea, body aches, sore throat and congestion. She does have a history of hypertension, has been taking her medications as prescribed.    PAST MEDICAL HISTORY  Past Medical History:   Diagnosis Date    Bilateral ovarian cysts     Hypertension     Murmur, cardiac     no cardiologist    Osteopenia     Snoring        SURGICAL HISTORY  Past Surgical History:   Procedure Laterality Date    PB REPAIR COLLAT ANKLE LIGMNT,SECONDARY  1/3/2024    Procedure: LEFT LATERAL LIGAMENT REPAIR;  Surgeon: Keshawn Cardoso M.D.;  Location: [facility];  Service: Orthopedics    PB OPEN TX DISTAL TIBIOFIBULAR JOINT DISRUPTION Left 1/3/2024    Procedure: LEFT SYNDESMOSIS OPEN REDUCTION INTERNAL FIXATION;  Surgeon: Keshawn Cardoso M.D.;  Location: [facility];  Service: Orthopedics    PB RELEASE TIB/FIB/ANKLE FLEX TENDON,EA Left 1/3/2024    Procedure: LEFT PERONEAL TENDON DEBRIDEMENT, REPAIRS AS INDICATED;  Surgeon: Keshawn Cardoso M.D.;  Location: [facility];  Service: Orthopedics    ANKLE ARTHROSCOPY Left 1/3/2024    Procedure:  LEFT ANKLE ARTHROSCOPY;  Surgeon: Keshawn Cardoso M.D.;  Location: [facility];  Service: Orthopedics    PB OPEN INTERN FIX ANTER PELV RING FX Right 2/7/2023    Procedure: RIGHT PERCUTANEOUS FIXATION ANTERIOR PELVIC RING;  Surgeon: Geovanni Marcial M.D.;  Location: [facility];  Service: Orthopedics    GYN LAPAROSCOPY  2020       FAMILY HISTORY  Family History   Problem Relation Age of Onset    Cancer Mother         Lung    Lung Disease Mother     Heart Disease Mother     Hypertension Mother     Cancer Father     Lung Disease Father     Heart Disease Father     Hypertension Father     Heart Disease Brother     Psychiatric Illness Brother     Neuromuscular disorder Brother        SOCIAL HISTORY   reports that she has never smoked. She has never used smokeless tobacco. She reports current alcohol use of about 1.2 oz of alcohol per week. She reports that she does not use drugs.    CURRENT MEDICATIONS  Previous Medications    ACETAMINOPHEN (TYLENOL) 500 MG TAB    Take 2 Tablets by mouth 3 times a day.    ACETAMINOPHEN (TYLENOL) 500 MG TAB    Take 2 Tablets by mouth 3 times a day.    ALENDRONATE (FOSAMAX) 70 MG TAB    Take 1 Tablet by mouth every 7 days.    ASCORBIC ACID (VITAMIN C) 1000 MG TAB    Take  by mouth.    CALCIUM CARBONATE-VIT D-MIN (CALCIUM 1200 PO)    Take  by mouth.    CYANOCOBALAMIN (VITAMIN B 12 PO)    Take  by mouth.    ESCITALOPRAM (LEXAPRO) 10 MG TAB    Take 1 Tablet by mouth every day.    GABAPENTIN (NEURONTIN) 100 MG CAP    Take 1 Capsule by mouth 3 times a day.    GABAPENTIN (NEURONTIN) 100 MG CAP    Take 1 Capsule by mouth 2 times a day.    GABAPENTIN (NEURONTIN) 300 MG CAP    Take 1 Capsule by mouth 3 times a day.    GABAPENTIN (NEURONTIN) 300 MG CAP    Take 1 Capsule by mouth 3 times a day.    HYDROCHLOROTHIAZIDE (MICROZIDE) 12.5 MG CAPSULE    Take 1 Capsule by mouth every day.    HYDROXYZINE PAMOATE (VISTARIL) 25 MG CAP    TAKE ONE CAPSULE BY MOUTH THREE TIMES  A DAY AS NEEDED FOR ITCHING OR OTHER (NAUSEA OR INSOMINA)    HYDROXYZINE PAMOATE (VISTARIL) 25 MG CAP    TAKE 1 CAPSULE BY MOUTH THREE TIMES A DAY AS NEEDED FOR ITCHING OR OTHER (NAUSEA OR INSOMNIA)    IBUPROFEN (MOTRIN) 600 MG TAB    Take 1 Tablet by mouth every 8 hours as needed for Moderate Pain or Inflammation.    IBUPROFEN (MOTRIN) 800 MG TAB    Take 1 Tablet by mouth every 8 hours as needed for Moderate Pain.    LIDOCAINE (XYLOCAINE) 2 % SOLUTION    5mL orally three times daily prn for 10 days, may be applied directly to surface of ulcers or used as a swish and spit up to three times daily as needed for oral ulcers    MELOXICAM (MOBIC) 15 MG TABLET    Take 1 Tablet by mouth every day.    OLMESARTAN (BENICAR) 20 MG TAB    Take 1 Tablet by mouth every day.       ALLERGIES  Patient has no known allergies.    PHYSICAL EXAM  VITAL SIGNS: /69   Pulse 94   Temp 36.3 °C (97.3 °F) (Temporal)   Resp 16   Ht 1.524 m (5')   Wt 72.3 kg (159 lb 4.8 oz)   SpO2 99%   BMI 31.11 kg/m²   Pulse ox interpretation: I interpret this pulse ox as normal.  Constitutional: Alert in no apparent distress.  HENT: No signs of trauma, Bilateral external ears normal, Nose normal. Mild erythema to soft palate  Eyes: Conjunctiva normal, Non-icteric.   Neck: Normal range of motion, Supple, No stridor.   Lymphatic: No lymphadenopathy noted.   Cardiovascular: Regular rate and rhythm, no murmurs.   Thorax & Lungs: Normal breath sounds, No respiratory distress, No wheezing. Intermittent dry cough.  Abdomen: Bowel sounds normal, Soft, No tenderness, No masses, No pulsatile masses. No peritoneal signs.  Skin: Warm, Dry, No erythema, No rash.   Back: No midline bony tenderness.   Extremities: Intact distal pulses, No edema, No cyanosis.  Musculoskeletal: Good range of motion in all major joints. No or major deformities noted.   Neurologic: Alert , Normal motor function, Normal sensory function, No focal deficits noted.   Psychiatric:  Affect normal, Judgment normal, Mood normal.     DIAGNOSTIC STUDIES    Labs:   Labs Reviewed   COV-2, FLU A/B, AND RSV BY PCR ("[employer]ID) - Abnormal; Notable for the following components:       Result Value    SARS-CoV-2 by PCR DETECTED (*)     All other components within normal limits   GROUP A STREP BY PCR     All labs reviewed by me.     COURSE & MEDICAL DECISION MAKING     INITIAL ASSESSMENT, COURSE AND PLAN  Care Narrative:     ED OBS: No; Patient does not meet criteria for ED Observation.     11:53 AM Patient presents to the ED with flu like symptoms for one day. Patient evaluated at bedside and discussed plan of care, including labs. Ordered for Group A strep and viral swab to evaluate her symptoms. Differential diagnoses include but not limited to: COVID, flu, strep, less likely pneumonia    12:52 PM - Pt is positive for COVID.  Prescription for Paxlovid offered, though patient does not have any high risk comorbidities.  Patient will now be discharged at this time.  Isolation recommendations and supportive care recommendations provided.      DISPOSITION AND DISCUSSIONS  I have discussed management of the patient with the following physicians and MENDOZA's:  None    Discussion of management with other QHP or appropriate source(s): None     Escalation of care considered, and ultimately not performed: acute inpatient care management, however at this time, the patient is most appropriate for outpatient management.  No hypoxia or other significant vital sign derangements, no distress.    Barriers to care at this time, including but not limited to: None    Decision tools and prescription drugs considered including, but not limited to: Antivirals given here .    DISPOSITION:  Patient will be discharged home in stable condition.    FOLLOW UP:  [facility], Emergency Dept  [address]  [place] [id number] [phone]    for severe symptoms    OUTPATIENT MEDICATIONS:  New Prescriptions     NIRMATRELVIR&RITONAVIR 300/100 20 X 150 MG & 10 X 100MG TABLET THERAPY PACK    Take 300 mg nirmatrelvir (two 150 mg tablets) with 100 mg  ritonavir (one 100 mg tablet) by mouth, with all three tablets taken together  twice daily for 5 days.      FINAL DIAGNOSIS  1. COVID-19         Madi MCCULLOUGH (Scribe), am scribing for, and in the presence of, Jimy Lopez M.D..    Electronically signed by: Madi Fuller (Scribe), 9/3/2024    IJimy M.D. personally performed the services described in this documentation, as scribed by Madi Fuller in my presence, and it is both accurate and complete.    The note accurately reflects work and decisions made by me.  Jimy Lopez M.D.  9/3/2024  4:00 PM

## 2024-09-03 NOTE — ED NOTES
Pt states she got off work yesterday she felt a cold coming on. Pt states she has a sore throat and chills and aches.

## 2024-09-03 NOTE — ED NOTES
Pt provided with DC paper work and follow up care and follow up care. Pt provided with printed prescription  and ambulated out of ER.

## 2024-09-03 NOTE — ED TRIAGE NOTES
Chief Complaint   Patient presents with    Flu Like Symptoms     Pt states she got off work yesterday she felt a cold coming on. + sore throat, cough, body aches and diarrhea.      /84   Pulse (!) 101   Temp 36.3 °C (97.3 °F) (Temporal)   Resp 16   Ht 1.524 m (5')   Wt 72.3 kg (159 lb 4.8 oz)   SpO2 94%   BMI 31.11 kg/m²

## 2024-09-03 NOTE — DISCHARGE INSTRUCTIONS
You do have COVID.  We have prescribed the antiviral medication that can sometimes shorten the course of this illness. COVID is a virus. Antibiotics do not help this kind of infection. Your immune system is built to clear this kind of infection. Take Tylenol as needed and plenty of fluids.  You can take over-the-counter immune supplement including: vitamin C 500mg twice daily, Zinc 75 mg/day, Vitamin D3 1000 U/day and melotonin 0.3 - 1 mg at night.  An 81mg daily aspirin can also be helpful. Self isolation at home is important. Per the CDC guidelines, you should self isolate until symptoms have resolved for 72 hours and there has not been any fever for 72 hours and it has been greater than 5 days since the onset of symptoms. Return to the ED if the is any significant shortness of breath, worsening symptoms, not improving as expected or concerns. COVID symptoms usually last 3-4 weeks, and sometimes longer.

## 2024-09-05 ENCOUNTER — APPOINTMENT (OUTPATIENT)
Dept: MEDICAL GROUP | Facility: PHYSICIAN GROUP | Age: 54
End: 2024-09-05

## 2024-09-17 DIAGNOSIS — Z87.81 HISTORY OF PELVIC FRACTURE: ICD-10-CM

## 2024-09-17 DIAGNOSIS — I10 PRIMARY HYPERTENSION: ICD-10-CM

## 2024-09-18 RX ORDER — OLMESARTAN MEDOXOMIL 20 MG/1
20 TABLET ORAL DAILY
Qty: 30 TABLET | Refills: 0 | Status: SHIPPED | OUTPATIENT
Start: 2024-09-18

## 2024-09-18 NOTE — TELEPHONE ENCOUNTER
Received request via: Pharmacy    Was the patient seen in the last year in this department? Yes    Does the patient have an active prescription (recently filled or refills available) for medication(s) requested? No    Pharmacy Name: [facility] [address]    Does the patient have halfway Plus and need 100-day supply? (This applies to ALL medications) Patient does not have SCP

## 2024-10-14 DIAGNOSIS — Z87.81 HISTORY OF PELVIC FRACTURE: ICD-10-CM

## 2024-10-14 DIAGNOSIS — Z78.0 POST-MENOPAUSAL: ICD-10-CM

## 2024-10-14 DIAGNOSIS — I10 PRIMARY HYPERTENSION: ICD-10-CM

## 2024-10-14 RX ORDER — ESCITALOPRAM OXALATE 10 MG/1
10 TABLET ORAL DAILY
Qty: 90 TABLET | Refills: 1 | Status: SHIPPED | OUTPATIENT
Start: 2024-10-14

## 2024-10-14 RX ORDER — OLMESARTAN MEDOXOMIL 20 MG/1
20 TABLET ORAL DAILY
Qty: 90 TABLET | Refills: 1 | Status: SHIPPED | OUTPATIENT
Start: 2024-10-14 | End: 2024-10-31

## 2024-10-30 DIAGNOSIS — Z87.81 HISTORY OF PELVIC FRACTURE: ICD-10-CM

## 2024-10-30 DIAGNOSIS — Z78.0 POST-MENOPAUSAL: ICD-10-CM

## 2024-10-31 ENCOUNTER — APPOINTMENT (OUTPATIENT)
Dept: MEDICAL GROUP | Facility: PHYSICIAN GROUP | Age: 54
End: 2024-10-31
Payer: COMMERCIAL

## 2024-10-31 VITALS
BODY MASS INDEX: 32.08 KG/M2 | WEIGHT: 163.4 LBS | DIASTOLIC BLOOD PRESSURE: 70 MMHG | OXYGEN SATURATION: 97 % | SYSTOLIC BLOOD PRESSURE: 110 MMHG | TEMPERATURE: 96.7 F | HEART RATE: 53 BPM | HEIGHT: 60 IN

## 2024-10-31 DIAGNOSIS — Z12.31 ENCOUNTER FOR SCREENING MAMMOGRAM FOR MALIGNANT NEOPLASM OF BREAST: ICD-10-CM

## 2024-10-31 DIAGNOSIS — Z12.83 SKIN CANCER SCREENING: ICD-10-CM

## 2024-10-31 DIAGNOSIS — Z23 NEED FOR VACCINATION: ICD-10-CM

## 2024-10-31 DIAGNOSIS — Z00.00 WELLNESS EXAMINATION: ICD-10-CM

## 2024-10-31 DIAGNOSIS — Z12.11 SCREENING FOR COLORECTAL CANCER: ICD-10-CM

## 2024-10-31 DIAGNOSIS — Z79.2 NEED FOR ANTIBIOTIC PROPHYLAXIS FOR DENTAL PROCEDURE: ICD-10-CM

## 2024-10-31 DIAGNOSIS — Z12.12 SCREENING FOR COLORECTAL CANCER: ICD-10-CM

## 2024-10-31 RX ORDER — AMOXICILLIN 500 MG/1
500 CAPSULE ORAL 3 TIMES DAILY
Qty: 30 CAPSULE | Refills: 0 | Status: SHIPPED | OUTPATIENT
Start: 2024-10-31 | End: 2024-10-31

## 2024-10-31 RX ORDER — IBUPROFEN 800 MG/1
800 TABLET, FILM COATED ORAL EVERY 8 HOURS PRN
Qty: 30 TABLET | Refills: 3 | Status: SHIPPED | OUTPATIENT
Start: 2024-10-31

## 2024-10-31 RX ORDER — AMOXICILLIN 500 MG/1
2000 CAPSULE ORAL
Qty: 4 CAPSULE | Refills: 1 | Status: SHIPPED | OUTPATIENT
Start: 2024-10-31

## 2024-10-31 ASSESSMENT — PATIENT HEALTH QUESTIONNAIRE - PHQ9: CLINICAL INTERPRETATION OF PHQ2 SCORE: 0

## 2024-10-31 ASSESSMENT — FIBROSIS 4 INDEX: FIB4 SCORE: 1.28

## 2024-11-11 DIAGNOSIS — Z87.81 HISTORY OF PELVIC FRACTURE: ICD-10-CM

## 2024-11-11 DIAGNOSIS — I10 PRIMARY HYPERTENSION: ICD-10-CM

## 2024-11-11 RX ORDER — OLMESARTAN MEDOXOMIL 20 MG/1
40 TABLET ORAL DAILY
Qty: 45 TABLET | Refills: 1 | Status: SHIPPED | OUTPATIENT
Start: 2024-11-11

## 2024-11-11 NOTE — TELEPHONE ENCOUNTER
Received request via: Patient    Was the patient seen in the last year in this department? Yes    Does the patient have an active prescription (recently filled or refills available) for medication(s) requested? No    [facility]    Does the patient have retirement Plus and need 100-day supply? (This applies to ALL medications) Patient does not have SCP        PATIENT WOULD LIKE TO GO UP TO 40 MG TABLETS SO SHE CAN CUT THEM IN HALF AND TAKE HALF TABLET FOR A TOTAL OF 45 DAYS

## 2024-11-21 ENCOUNTER — APPOINTMENT (OUTPATIENT)
Dept: RADIOLOGY | Facility: MEDICAL CENTER | Age: 54
End: 2024-11-21
Payer: COMMERCIAL

## 2024-11-27 ENCOUNTER — HOSPITAL ENCOUNTER (OUTPATIENT)
Dept: LAB | Facility: MEDICAL CENTER | Age: 54
End: 2024-11-27
Payer: COMMERCIAL

## 2024-11-27 ENCOUNTER — OFFICE VISIT (OUTPATIENT)
Dept: MEDICAL GROUP | Facility: PHYSICIAN GROUP | Age: 54
End: 2024-11-27
Payer: COMMERCIAL

## 2024-11-27 VITALS
HEART RATE: 56 BPM | WEIGHT: 160 LBS | RESPIRATION RATE: 16 BRPM | BODY MASS INDEX: 31.41 KG/M2 | SYSTOLIC BLOOD PRESSURE: 116 MMHG | TEMPERATURE: 97.5 F | DIASTOLIC BLOOD PRESSURE: 70 MMHG | HEIGHT: 60 IN | OXYGEN SATURATION: 97 %

## 2024-11-27 DIAGNOSIS — K04.7 DENTAL ABSCESS: ICD-10-CM

## 2024-11-27 DIAGNOSIS — N95.2 VAGINAL ATROPHY: ICD-10-CM

## 2024-11-27 DIAGNOSIS — N95.1 VASOMOTOR SYMPTOMS DUE TO MENOPAUSE: ICD-10-CM

## 2024-11-27 DIAGNOSIS — Z00.00 WELLNESS EXAMINATION: ICD-10-CM

## 2024-11-27 DIAGNOSIS — B37.9 YEAST INFECTION: ICD-10-CM

## 2024-11-27 DIAGNOSIS — M85.80 OSTEOPENIA WITH HIGH RISK OF FRACTURE: ICD-10-CM

## 2024-11-27 LAB
ALBUMIN SERPL BCP-MCNC: 4.2 G/DL (ref 3.2–4.9)
ALBUMIN/GLOB SERPL: 1.5 G/DL
ALP SERPL-CCNC: 63 U/L (ref 30–99)
ALT SERPL-CCNC: 9 U/L (ref 2–50)
ANION GAP SERPL CALC-SCNC: 12 MMOL/L (ref 7–16)
AST SERPL-CCNC: 16 U/L (ref 12–45)
BASOPHILS # BLD AUTO: 1.1 % (ref 0–1.8)
BASOPHILS # BLD: 0.06 K/UL (ref 0–0.12)
BILIRUB SERPL-MCNC: 0.3 MG/DL (ref 0.1–1.5)
BUN SERPL-MCNC: 27 MG/DL (ref 8–22)
CALCIUM ALBUM COR SERPL-MCNC: 9 MG/DL (ref 8.5–10.5)
CALCIUM SERPL-MCNC: 9.2 MG/DL (ref 8.5–10.5)
CHLORIDE SERPL-SCNC: 105 MMOL/L (ref 96–112)
CHOLEST SERPL-MCNC: 174 MG/DL (ref 100–199)
CO2 SERPL-SCNC: 22 MMOL/L (ref 20–33)
CREAT SERPL-MCNC: 1.03 MG/DL (ref 0.5–1.4)
EOSINOPHIL # BLD AUTO: 0.21 K/UL (ref 0–0.51)
EOSINOPHIL NFR BLD: 3.7 % (ref 0–6.9)
ERYTHROCYTE [DISTWIDTH] IN BLOOD BY AUTOMATED COUNT: 44.7 FL (ref 35.9–50)
EST. AVERAGE GLUCOSE BLD GHB EST-MCNC: 85 MG/DL
ESTRADIOL SERPL-MCNC: 86.6 PG/ML
FASTING STATUS PATIENT QL REPORTED: NORMAL
GFR SERPLBLD CREATININE-BSD FMLA CKD-EPI: 63 ML/MIN/1.73 M 2
GLOBULIN SER CALC-MCNC: 2.8 G/DL (ref 1.9–3.5)
GLUCOSE SERPL-MCNC: 81 MG/DL (ref 65–99)
HBA1C MFR BLD: 4.6 % (ref 4–5.6)
HCT VFR BLD AUTO: 37.1 % (ref 37–47)
HDLC SERPL-MCNC: 54 MG/DL
HGB BLD-MCNC: 12.5 G/DL (ref 12–16)
IMM GRANULOCYTES # BLD AUTO: 0.01 K/UL (ref 0–0.11)
IMM GRANULOCYTES NFR BLD AUTO: 0.2 % (ref 0–0.9)
LDLC SERPL CALC-MCNC: 109 MG/DL
LYMPHOCYTES # BLD AUTO: 2.32 K/UL (ref 1–4.8)
LYMPHOCYTES NFR BLD: 40.6 % (ref 22–41)
MCH RBC QN AUTO: 31.9 PG (ref 27–33)
MCHC RBC AUTO-ENTMCNC: 33.7 G/DL (ref 32.2–35.5)
MCV RBC AUTO: 94.6 FL (ref 81.4–97.8)
MONOCYTES # BLD AUTO: 0.52 K/UL (ref 0–0.85)
MONOCYTES NFR BLD AUTO: 9.1 % (ref 0–13.4)
NEUTROPHILS # BLD AUTO: 2.59 K/UL (ref 1.82–7.42)
NEUTROPHILS NFR BLD: 45.3 % (ref 44–72)
NRBC # BLD AUTO: 0 K/UL
NRBC BLD-RTO: 0 /100 WBC (ref 0–0.2)
PLATELET # BLD AUTO: 264 K/UL (ref 164–446)
PMV BLD AUTO: 9.1 FL (ref 9–12.9)
POTASSIUM SERPL-SCNC: 4.5 MMOL/L (ref 3.6–5.5)
PROGEST SERPL-MCNC: 0.26 NG/ML
PROT SERPL-MCNC: 7 G/DL (ref 6–8.2)
RBC # BLD AUTO: 3.92 M/UL (ref 4.2–5.4)
SODIUM SERPL-SCNC: 139 MMOL/L (ref 135–145)
TRIGL SERPL-MCNC: 56 MG/DL (ref 0–149)
WBC # BLD AUTO: 5.7 K/UL (ref 4.8–10.8)

## 2024-11-27 PROCEDURE — 83036 HEMOGLOBIN GLYCOSYLATED A1C: CPT

## 2024-11-27 PROCEDURE — 84144 ASSAY OF PROGESTERONE: CPT

## 2024-11-27 PROCEDURE — 85025 COMPLETE CBC W/AUTO DIFF WBC: CPT

## 2024-11-27 PROCEDURE — 80061 LIPID PANEL: CPT

## 2024-11-27 PROCEDURE — 82670 ASSAY OF TOTAL ESTRADIOL: CPT

## 2024-11-27 PROCEDURE — 3078F DIAST BP <80 MM HG: CPT

## 2024-11-27 PROCEDURE — 36415 COLL VENOUS BLD VENIPUNCTURE: CPT

## 2024-11-27 PROCEDURE — 80053 COMPREHEN METABOLIC PANEL: CPT

## 2024-11-27 PROCEDURE — 99214 OFFICE O/P EST MOD 30 MIN: CPT

## 2024-11-27 PROCEDURE — 3074F SYST BP LT 130 MM HG: CPT

## 2024-11-27 RX ORDER — FLUCONAZOLE 150 MG/1
150 TABLET ORAL
Qty: 2 TABLET | Refills: 0 | Status: SHIPPED | OUTPATIENT
Start: 2024-11-27

## 2024-11-27 RX ORDER — ESTRADIOL 0.1 MG/G
1 CREAM VAGINAL
Qty: 42.5 G | Refills: 2 | Status: SHIPPED | OUTPATIENT
Start: 2024-11-27

## 2024-11-27 RX ORDER — ESTRADIOL 0.04 MG/D
1 PATCH, EXTENDED RELEASE TRANSDERMAL
Qty: 24 PATCH | Refills: 3 | Status: SHIPPED | OUTPATIENT
Start: 2024-11-27

## 2024-11-27 RX ORDER — PROGESTERONE 100 MG/1
100 CAPSULE ORAL NIGHTLY
Qty: 90 CAPSULE | Refills: 3 | Status: SHIPPED | OUTPATIENT
Start: 2024-11-27

## 2024-11-27 RX ORDER — AMOXICILLIN 875 MG/1
875 TABLET, COATED ORAL 2 TIMES DAILY
Qty: 14 TABLET | Refills: 0 | Status: SHIPPED | OUTPATIENT
Start: 2024-11-27

## 2024-11-27 ASSESSMENT — ENCOUNTER SYMPTOMS
MYALGIAS: 0
SHORTNESS OF BREATH: 0
DIARRHEA: 0
HEADACHES: 0
WEAKNESS: 0
FEVER: 0
WEIGHT LOSS: 0
CHILLS: 0
NAUSEA: 0
DIZZINESS: 0
BLURRED VISION: 0
ABDOMINAL PAIN: 0
COUGH: 0
VOMITING: 0
CONSTIPATION: 0

## 2024-11-27 ASSESSMENT — FIBROSIS 4 INDEX: FIB4 SCORE: 1.28

## 2024-11-27 NOTE — PROGRESS NOTES
"Verbal consent was acquired by the patient to use [employer] ambient listening note generation during this visit  Subjective:     CC: HRT    HPI:   Gabriela presents today with  History of Present Illness  The patient presents for evaluation of multiple medical concerns.    She has been engaging in research to enhance her energy levels and improve her sexual health. She has listened to several podcasts, including \"Estrogen Matters\" and \"The New Menopause\". She is currently fasting and has not yet completed her lab tests.     She has a uterus and is experiencing vaginal dryness, which is causing discomfort and itching. Her last menstrual period was in 2022.    She has osteopenia and has been taking calcium supplements, although not consistently. Recently, she has increased her intake of calcium-rich foods such as cheese and yogurt. She also takes magnesium at night to aid sleep.    She is experiencing swelling in her mouth due to a broken tooth. She has an appointment with her dentist on 12/11/2024. She is seeking a stronger pain medication than the 800 mg she is currently taking, as the pain is severe and throbbing. She has previously experienced yeast infections when taking amoxicillin.    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      No problems updated.      ROS:  Review of Systems   Constitutional:  Negative for chills, fever, malaise/fatigue and weight loss.   Eyes:  Negative for blurred vision.   Respiratory:  Negative for cough and shortness of breath.    Cardiovascular:  Negative for chest pain.   Gastrointestinal:  Negative for abdominal pain, constipation, diarrhea, nausea and vomiting.   Musculoskeletal:  Negative for myalgias.   Neurological:  Negative for dizziness, weakness and headaches.       Objective:     Exam:  /70 (BP Location: Left arm, Patient Position: Sitting, BP Cuff Size: Adult)   Pulse (!) 56   Temp 36.4 °C (97.5 °F) (Temporal)   Resp 16   Ht 1.524 m (5')   Wt 72.6 kg (160 " lb)   SpO2 97%   BMI 31.25 kg/m²  Body mass index is 31.25 kg/m².    Physical Exam  Constitutional:       General: She is not in acute distress.     Appearance: Normal appearance. She is not ill-appearing or toxic-appearing.   HENT:      Head: Normocephalic.      Mouth/Throat:      Dentition: Abnormal dentition. Dental tenderness, gingival swelling, dental caries and dental abscesses present.     Eyes:      Conjunctiva/sclera: Conjunctivae normal.   Pulmonary:      Effort: Pulmonary effort is normal.   Skin:     General: Skin is warm and dry.   Neurological:      General: No focal deficit present.      Mental Status: She is alert and oriented to person, place, and time.   Psychiatric:         Mood and Affect: Mood normal.         Behavior: Behavior normal.         Labs:     Assessment & Plan: Medical Decision Making     54 y.o. female with the following -   Assessment & Plan  1. Vasomotor syndrome of menopause.  Given her lack of history with blood clots, heart attack, stroke, irregular bleeding, pregnancy, or liver cirrhosis, she is deemed a suitable candidate for hormone therapy.  Patient verbalizes informed consent to start hormone therapy and understands all risk and benefits.  Baseline estrogen and progesterone levels will be obtained today, with a follow-up in 6 months. A prescription for Vivelle-Dot 0.375 mg patch, to be applied twice weekly, has been provided. Prometrium is recommended for nightly use. Vaginal estrogen cream 1 g is to be applied nightly for 2 weeks, then reduced to twice weekly. She is advised to maintain her calcium intake through diet and continue with vitamin D3, K2, and magnesium supplements at night. If symptoms persist after 3-4  weeks, the dosage of Vivelle-Dot will be adjusted.    2. Tooth abscess.   Amoxicillin 875 mg has been prescribed for twice daily use over a week. Diflucan has also been prescribed, with instructions to take a second dose 48 to 72 hours after the first if  necessary.    Follow-up  Patient is scheduled for a follow-up visit on March 6, 2025, at 11:20 AM.    Problem List Items Addressed This Visit       Osteopenia with high risk of fracture    Relevant Medications    progesterone (PROMETRIUM) 100 MG Cap    estradiol (VIVELLE) 0.0375 MG/24HR patch    Other Relevant Orders    ESTRADIOL    PROGESTERONE     Other Visit Diagnoses       Vasomotor symptoms due to menopause        Relevant Medications    progesterone (PROMETRIUM) 100 MG Cap    estradiol (VIVELLE) 0.0375 MG/24HR patch    estradiol (ESTRACE) 0.1 MG/GM vaginal cream    Other Relevant Orders    ESTRADIOL    PROGESTERONE    Vaginal atrophy        Relevant Medications    progesterone (PROMETRIUM) 100 MG Cap    estradiol (VIVELLE) 0.0375 MG/24HR patch    estradiol (ESTRACE) 0.1 MG/GM vaginal cream    Other Relevant Orders    ESTRADIOL    PROGESTERONE    Dental abscess        Relevant Medications    amoxicillin (AMOXIL) 875 MG tablet    Yeast infection        Relevant Medications    fluconazole (DIFLUCAN) 150 MG tablet            Differential diagnosis, natural history, supportive care, and indications for immediate follow-up discussed.  Shared decision making approach utilized, and patient is amendable with plan of care.  Patient understands to return to clinic or go to the emergency department if symptoms worsen. All questions and concerns addressed to the best of my knowledge.    Return in about 3 months (around 2/27/2025), or if symptoms worsen or fail to improve.    Please note that this dictation was created using voice recognition software. I have made every reasonable attempt to correct obvious errors, but I expect that there are errors of grammar and possibly content that I did not discover before finalizing the note.

## 2024-12-05 ENCOUNTER — APPOINTMENT (OUTPATIENT)
Dept: RADIOLOGY | Facility: MEDICAL CENTER | Age: 54
End: 2024-12-05
Payer: COMMERCIAL

## 2024-12-05 DIAGNOSIS — Z12.31 ENCOUNTER FOR SCREENING MAMMOGRAM FOR MALIGNANT NEOPLASM OF BREAST: ICD-10-CM

## 2024-12-05 PROCEDURE — 77067 SCR MAMMO BI INCL CAD: CPT

## 2025-01-11 DIAGNOSIS — Z87.81 HISTORY OF PELVIC FRACTURE: ICD-10-CM

## 2025-01-11 DIAGNOSIS — Z78.0 POST-MENOPAUSAL: ICD-10-CM

## 2025-01-11 DIAGNOSIS — I10 PRIMARY HYPERTENSION: ICD-10-CM

## 2025-01-13 ENCOUNTER — TELEPHONE (OUTPATIENT)
Dept: MEDICAL GROUP | Facility: PHYSICIAN GROUP | Age: 55
End: 2025-01-13
Payer: COMMERCIAL

## 2025-01-13 RX ORDER — ESCITALOPRAM OXALATE 10 MG/1
10 TABLET ORAL DAILY
Qty: 90 TABLET | Refills: 3 | Status: SHIPPED | OUTPATIENT
Start: 2025-01-13

## 2025-01-13 RX ORDER — OLMESARTAN MEDOXOMIL 20 MG/1
40 TABLET ORAL DAILY
Qty: 180 TABLET | Refills: 2 | Status: SHIPPED | OUTPATIENT
Start: 2025-01-13 | End: 2025-01-18 | Stop reason: SDUPTHER

## 2025-01-13 RX ORDER — IBUPROFEN 800 MG/1
800 TABLET, FILM COATED ORAL EVERY 8 HOURS PRN
Qty: 30 TABLET | Refills: 0 | Status: SHIPPED | OUTPATIENT
Start: 2025-01-13

## 2025-01-13 NOTE — TELEPHONE ENCOUNTER
Received request via: Pharmacy    Was the patient seen in the last year in this department? Yes    Does the patient have an active prescription (recently filled or refills available) for medication(s) requested? No    Pharmacy Name: [facility] [address]    Does the patient have shelter Plus and need 100-day supply? (This applies to ALL medications) Patient does not have SCP

## 2025-01-18 DIAGNOSIS — Z87.81 HISTORY OF PELVIC FRACTURE: ICD-10-CM

## 2025-01-18 DIAGNOSIS — M85.80 OSTEOPENIA WITH HIGH RISK OF FRACTURE: ICD-10-CM

## 2025-01-18 DIAGNOSIS — N95.1 VASOMOTOR SYMPTOMS DUE TO MENOPAUSE: ICD-10-CM

## 2025-01-18 DIAGNOSIS — N95.2 VAGINAL ATROPHY: ICD-10-CM

## 2025-01-18 DIAGNOSIS — I10 PRIMARY HYPERTENSION: ICD-10-CM

## 2025-01-18 DIAGNOSIS — K04.7 DENTAL ABSCESS: ICD-10-CM

## 2025-01-20 RX ORDER — ESTRADIOL 0.04 MG/D
1 PATCH, EXTENDED RELEASE TRANSDERMAL
Qty: 24 PATCH | Refills: 3 | Status: SHIPPED | OUTPATIENT
Start: 2025-01-20

## 2025-01-20 RX ORDER — ESTRADIOL 0.1 MG/G
1 CREAM VAGINAL
Qty: 42.5 G | Refills: 2 | Status: SHIPPED | OUTPATIENT
Start: 2025-01-20

## 2025-01-20 RX ORDER — PROGESTERONE 100 MG/1
100 CAPSULE ORAL NIGHTLY
Qty: 90 CAPSULE | Refills: 3 | Status: SHIPPED | OUTPATIENT
Start: 2025-01-20

## 2025-01-20 NOTE — TELEPHONE ENCOUNTER
Received request via: Patient    Was the patient seen in the last year in this department? Yes    Does the patient have an active prescription (recently filled or refills available) for medication(s) requested? No    Pharmacy Name:   [facility] [id number] [facility] - [address]       Does the patient have group home Plus and need 100-day supply? (This applies to ALL medications) Patient does not have SCP

## 2025-01-20 NOTE — TELEPHONE ENCOUNTER
Received request via: Patient    Was the patient seen in the last year in this department? Yes    Does the patient have an active prescription (recently filled or refills available) for medication(s) requested? No    Pharmacy Name: Pharmacy: [facility]"" Pharmacy [id number] [facility] - [address]     Does the patient have nursing home Plus and need 100-day supply? (This applies to ALL medications) Patient does not have SCP

## 2025-01-21 RX ORDER — AMOXICILLIN 875 MG/1
875 TABLET, COATED ORAL 2 TIMES DAILY
Qty: 14 TABLET | Refills: 0 | Status: SHIPPED | OUTPATIENT
Start: 2025-01-21

## 2025-01-21 RX ORDER — OLMESARTAN MEDOXOMIL 20 MG/1
40 TABLET ORAL DAILY
Qty: 180 TABLET | Refills: 0 | Status: SHIPPED | OUTPATIENT
Start: 2025-01-21

## 2025-02-11 ENCOUNTER — TELEPHONE (OUTPATIENT)
Dept: MEDICAL GROUP | Facility: PHYSICIAN GROUP | Age: 55
End: 2025-02-11
Payer: COMMERCIAL

## 2025-02-11 DIAGNOSIS — N95.1 VASOMOTOR SYMPTOMS DUE TO MENOPAUSE: ICD-10-CM

## 2025-02-11 RX ORDER — ESTRADIOL 0.5 MG/.5G
0.5 GEL TOPICAL DAILY
Qty: 30 EACH | Refills: 3 | Status: SHIPPED | OUTPATIENT
Start: 2025-02-11

## 2025-02-13 ENCOUNTER — TELEPHONE (OUTPATIENT)
Dept: MEDICAL GROUP | Facility: PHYSICIAN GROUP | Age: 55
End: 2025-02-13
Payer: COMMERCIAL

## 2025-02-13 NOTE — TELEPHONE ENCOUNTER
Caller Name: Gabriela Carrillo    Call Back Number: There are no phone numbers on file.      How would the patient prefer to be contacted with a response: Phone call OK to leave a detailed message    Estradiol patch is causing allergic reaction and she is requesting a new medication

## 2025-02-27 DIAGNOSIS — N95.2 VAGINAL ATROPHY: ICD-10-CM

## 2025-02-27 DIAGNOSIS — I10 PRIMARY HYPERTENSION: ICD-10-CM

## 2025-02-27 DIAGNOSIS — Z87.81 HISTORY OF PELVIC FRACTURE: ICD-10-CM

## 2025-02-27 DIAGNOSIS — N95.1 VASOMOTOR SYMPTOMS DUE TO MENOPAUSE: ICD-10-CM

## 2025-02-27 DIAGNOSIS — M85.80 OSTEOPENIA WITH HIGH RISK OF FRACTURE: ICD-10-CM

## 2025-02-27 RX ORDER — ESTRADIOL 0.1 MG/G
1 CREAM VAGINAL
Qty: 42.5 G | Refills: 2 | Status: SHIPPED | OUTPATIENT
Start: 2025-02-27

## 2025-02-27 RX ORDER — PROGESTERONE 100 MG/1
100 CAPSULE ORAL NIGHTLY
Qty: 90 CAPSULE | Refills: 2 | Status: SHIPPED | OUTPATIENT
Start: 2025-02-27

## 2025-02-27 RX ORDER — OLMESARTAN MEDOXOMIL 20 MG/1
40 TABLET ORAL DAILY
Qty: 180 TABLET | Refills: 2 | Status: SHIPPED | OUTPATIENT
Start: 2025-02-27

## 2025-02-27 NOTE — TELEPHONE ENCOUNTER
Received request via: Pharmacy    Was the patient seen in the last year in this department? Yes    Does the patient have an active prescription (recently filled or refills available) for medication(s) requested? No    Pharmacy Name: [facility]    Does the patient have senior living Plus and need 100-day supply? (This applies to ALL medications) Patient does not have SCP

## 2025-02-27 NOTE — TELEPHONE ENCOUNTER
Received request via: Pharmacy    Was the patient seen in the last year in this department? Yes    Does the patient have an active prescription (recently filled or refills available) for medication(s) requested? No    Pharmacy Name: [facility] [address]    Does the patient have correction Plus and need 100-day supply? (This applies to ALL medications) Patient does not have SCP

## 2025-02-27 NOTE — TELEPHONE ENCOUNTER
Received request via: Pharmacy    Was the patient seen in the last year in this department? Yes    Does the patient have an active prescription (recently filled or refills available) for medication(s) requested? No    Pharmacy Name: [facility] [address]    Does the patient have [facility] Plus and need 100-day supply? (This applies to ALL medications) Patient does not have SCP

## 2025-03-06 ENCOUNTER — OFFICE VISIT (OUTPATIENT)
Dept: MEDICAL GROUP | Facility: PHYSICIAN GROUP | Age: 55
End: 2025-03-06
Payer: COMMERCIAL

## 2025-03-06 VITALS
HEART RATE: 52 BPM | TEMPERATURE: 97 F | BODY MASS INDEX: 28.51 KG/M2 | HEIGHT: 60 IN | SYSTOLIC BLOOD PRESSURE: 90 MMHG | WEIGHT: 145.2 LBS | DIASTOLIC BLOOD PRESSURE: 60 MMHG | OXYGEN SATURATION: 99 %

## 2025-03-06 DIAGNOSIS — M85.80 OSTEOPENIA WITH HIGH RISK OF FRACTURE: ICD-10-CM

## 2025-03-06 DIAGNOSIS — M25.572 CHRONIC PAIN OF LEFT ANKLE: ICD-10-CM

## 2025-03-06 DIAGNOSIS — E66.3 OVERWEIGHT (BMI 25.0-29.9): ICD-10-CM

## 2025-03-06 DIAGNOSIS — G89.29 CHRONIC PAIN OF LEFT ANKLE: ICD-10-CM

## 2025-03-06 DIAGNOSIS — H91.90 HARD OF HEARING: ICD-10-CM

## 2025-03-06 DIAGNOSIS — Z87.81 HISTORY OF PELVIC FRACTURE: ICD-10-CM

## 2025-03-06 DIAGNOSIS — Z78.0 POST-MENOPAUSAL: ICD-10-CM

## 2025-03-06 DIAGNOSIS — N95.1 VASOMOTOR SYMPTOMS DUE TO MENOPAUSE: ICD-10-CM

## 2025-03-06 PROBLEM — E66.9 OBESITY (BMI 30-39.9): Status: RESOLVED | Noted: 2022-08-26 | Resolved: 2025-03-06

## 2025-03-06 PROCEDURE — 3078F DIAST BP <80 MM HG: CPT

## 2025-03-06 PROCEDURE — 99214 OFFICE O/P EST MOD 30 MIN: CPT

## 2025-03-06 PROCEDURE — 3074F SYST BP LT 130 MM HG: CPT

## 2025-03-06 RX ORDER — ESTRADIOL 0.5 MG/.5G
0.5 GEL TOPICAL DAILY
Qty: 90 EACH | Refills: 3 | Status: SHIPPED | OUTPATIENT
Start: 2025-03-06

## 2025-03-06 RX ORDER — IBUPROFEN 800 MG/1
800 TABLET, FILM COATED ORAL EVERY 8 HOURS PRN
Qty: 30 TABLET | Refills: 0 | Status: SHIPPED | OUTPATIENT
Start: 2025-03-06

## 2025-03-06 ASSESSMENT — ENCOUNTER SYMPTOMS
VOMITING: 0
CONSTIPATION: 0
SHORTNESS OF BREATH: 0
WEIGHT LOSS: 0
NAUSEA: 0
WEAKNESS: 0
COUGH: 0
DIZZINESS: 0
FEVER: 0
DIARRHEA: 0
MYALGIAS: 0
HEADACHES: 0
ABDOMINAL PAIN: 0
CHILLS: 0
BLURRED VISION: 0

## 2025-03-06 ASSESSMENT — PATIENT HEALTH QUESTIONNAIRE - PHQ9: CLINICAL INTERPRETATION OF PHQ2 SCORE: 0

## 2025-03-06 ASSESSMENT — FIBROSIS 4 INDEX: FIB4 SCORE: 1.15

## 2025-03-06 NOTE — PROGRESS NOTES
Verbal consent was acquired by the patient to use [employer] ambient listening note generation during this visit  Subjective:     CC:  Diagnoses of Vasomotor symptoms due to menopause, Osteopenia with high risk of fracture, Overweight (BMI 25.0-29.9), Post-menopausal, History of pelvic fracture, Chronic pain of left ankle, and Hard of hearing were pertinent to this visit.    HISTORY OF THE PRESENT ILLNESS: Patient is a 55 y.o. female.   Problem   Overweight (Bmi 25.0-29.9)   Obesity (Bmi 30-39.9) (Resolved)    Chronic condition-  -currently is 173 pounds and BMI is 33.44 which has increased since August in which weight was 160 pounds and patient's BMI was 31%.  -Unfortunately patient is unable to exercise due to pelvic fracture  -She has been indulging over the holidays          History of Present Illness  The patient presents for evaluation of hormone replacement therapy, osteopenia, blood pressure management, weight management, depression, chronic left ankle pain, hearing impairment, and skin tag.    She experienced an allergic reaction to the hormone patches, leading to a transition to a daily topical gel applied on her thigh, which she tolerates well. She has been on this regimen for a few weeks without any adverse effects. She reports improved sleep quality with Prometrium 100 mg. She was prescribed vaginal cream for vaginal dryness in November, which she found beneficial but has since run out. She is not currently taking Diflucan.    She discontinued Fosamax due to intolerance. She maintains a healthy diet and exercises regularly, walking approximately 8 miles daily at her workplace.    She is not currently taking hydrochlorothiazide. Her blood pressure is well-controlled, and she has lost 30 pounds since starting estrogen therapy, which she attributes to increased energy levels. She is currently taking olmesartan 20 mg once daily, although it was initially prescribed twice daily. She hopes to discontinue  this medication upon losing an additional 15 pounds.    She continues to take Lexapro for hot flashes, which she reports as effective.    She alternates between Tylenol and ibuprofen for left ankle pain and swelling, using Tylenol daily and ibuprofen every other day. She also takes gabapentin for nerve damage in the same ankle. She requests a refill of ibuprofen.    She requests a referral to an ENT specialist due to recent hearing issues. She reports a sensation of clogging in one ear for several months, which she attributes to a congestive cold, but the sensation persists.    She has not yet been contacted by dermatology regarding a skin tag on her back.    Supplemental Information  She had a broken tooth that was pulled. She has antibiotics for a dental cleaning appointment next week. She has a colonoscopy scheduled for 03/21/2025.    SOCIAL HISTORY  She works at [employer].    ALLERGIES  The patient had an allergic reaction to the PATCHES.    MEDICATIONS  Current: topical estrogen gel, Prometrium, olmesartan, Lexapro, ibuprofen, Tylenol, gabapentin  Discontinued: Fosamax, hydrochlorothiazide, Diflucan    ROS:   Review of Systems   Constitutional:  Negative for chills, fever, malaise/fatigue and weight loss.   Eyes:  Negative for blurred vision.   Respiratory:  Negative for cough and shortness of breath.    Cardiovascular:  Negative for chest pain.   Gastrointestinal:  Negative for abdominal pain, constipation, diarrhea, nausea and vomiting.   Musculoskeletal:  Negative for myalgias.   Neurological:  Negative for dizziness, weakness and headaches.         Objective:     Exam: BP 90/60 (BP Location: Left arm, Patient Position: Sitting, BP Cuff Size: Adult)   Pulse (!) 52   Temp 36.1 °C (97 °F) (Temporal)   Ht 1.524 m (5')   Wt 65.9 kg (145 lb 3.2 oz)   SpO2 99%  Body mass index is 28.36 kg/m².    Physical Exam  Constitutional:       General: She is not in acute distress.     Appearance: Normal appearance. She is  not ill-appearing or toxic-appearing.   HENT:      Head: Normocephalic.   Eyes:      Conjunctiva/sclera: Conjunctivae normal.   Cardiovascular:      Rate and Rhythm: Normal rate and regular rhythm.      Pulses: Normal pulses.      Heart sounds: Normal heart sounds. No murmur heard.  Pulmonary:      Effort: Pulmonary effort is normal. No respiratory distress.      Breath sounds: Normal breath sounds.   Skin:     General: Skin is warm and dry.   Neurological:      General: No focal deficit present.      Mental Status: She is alert and oriented to person, place, and time.   Psychiatric:         Mood and Affect: Mood normal.         Behavior: Behavior normal.           Labs:   Lab Results   Component Value Date/Time    HBA1C 4.6 11/27/2024 02:43 PM    HBA1C 4.9 12/24/2022 08:49 AM     No visits with results within 1 Month(s) from this visit.   Latest known visit with results is:   Hospital Outpatient Visit on 11/27/2024   Component Date Value    Progesterone 11/27/2024 0.26     Estradiol-E2 11/27/2024 86.6     Glycohemoglobin 11/27/2024 4.6     Est Avg Glucose 11/27/2024 85     Cholesterol,Tot 11/27/2024 174     Triglycerides 11/27/2024 56     HDL 11/27/2024 54     LDL 11/27/2024 109 (H)     Sodium 11/27/2024 139     Potassium 11/27/2024 4.5     Chloride 11/27/2024 105     Co2 11/27/2024 22     Anion Gap 11/27/2024 12.0     Glucose 11/27/2024 81     Bun 11/27/2024 27 (H)     Creatinine 11/27/2024 1.03     Calcium 11/27/2024 9.2     Correct Calcium 11/27/2024 9.0     AST(SGOT) 11/27/2024 16     ALT(SGPT) 11/27/2024 9     Alkaline Phosphatase 11/27/2024 63     Total Bilirubin 11/27/2024 0.3     Albumin 11/27/2024 4.2     Total Protein 11/27/2024 7.0     Globulin 11/27/2024 2.8     A-G Ratio 11/27/2024 1.5     WBC 11/27/2024 5.7     RBC 11/27/2024 3.92 (L)     Hemoglobin 11/27/2024 12.5     Hematocrit 11/27/2024 37.1     MCV 11/27/2024 94.6     MCH 11/27/2024 31.9     MCHC 11/27/2024 33.7     RDW 11/27/2024 44.7      Platelet Count 11/27/2024 264     MPV 11/27/2024 9.1     Neutrophils-Polys 11/27/2024 45.30     Lymphocytes 11/27/2024 40.60     Monocytes 11/27/2024 9.10     Eosinophils 11/27/2024 3.70     Basophils 11/27/2024 1.10     Immature Granulocytes 11/27/2024 0.20     Nucleated RBC 11/27/2024 0.00     Neutrophils (Absolute) 11/27/2024 2.59     Lymphs (Absolute) 11/27/2024 2.32     Monos (Absolute) 11/27/2024 0.52     Eos (Absolute) 11/27/2024 0.21     Baso (Absolute) 11/27/2024 0.06     Immature Granulocytes (a* 11/27/2024 0.01     NRBC (Absolute) 11/27/2024 0.00     Fasting Status 11/27/2024 Fasting     GFR (CKD-EPI) 11/27/2024 63          Assessment & Plan: Medical Decision Making   55 y.o. female with the following -    Assessment & Plan  1. Hormone replacement therapy.  She experienced an allergic reaction to the patches and was switched to a topical gel, which she applies daily on her thigh without issues. She has been on this regimen for a couple of weeks. She is also taking Prometrium 100 mg at night, which helps her sleep well. She was previously prescribed vaginal cream for vaginal dryness in November, which she found beneficial but has since run out. She is not currently taking Diflucan. She will continue with the current regimen of estrogen gel and Prometrium 100 mg. A 90-day supply of the estrogen gel and Prometrium has been provided. She will continue using the vaginal estrogen cream twice weekly for vaginal atrophy. Hormone levels, including estradiol and progesterone, will be reassessed in 3 months. If progesterone levels are not within the desired range, the dosage may be increased.    2. Osteopenia.  She is a suitable candidate for estrogen therapy due to her existing osteopenia. She discontinued Fosamax due to intolerance. The estrogen gel 0.005% daily is expected to be more beneficial than Fosamax. Bone density will be re-evaluated at a future date to assess the effectiveness of the current  treatment.    3. Blood pressure management.  Her blood pressure is well-managed, and she has transitioned from the obesity category to the overweight category, with a BMI reduction from the 30s to the 20s. She is currently taking olmesartan 20 mg once daily, although it was initially prescribed twice daily. She hopes to discontinue this medication upon losing an additional 15 pounds. She will continue with the current regimen of olmesartan 20 mg once daily. Blood pressure will be monitored during the next visit.    4. Depression.  She continues to take Lexapro for hot flashes, which she reports as effective. A gradual tapering off of Lexapro is planned over an 8-week period. The dosage will be reduced to 10 mg 3 times a week, alternating with 5 mg, then 10 mg twice a week, and finally down to 5 mg daily. This will be followed by a further reduction to 6 times, 5 times, 4 times, 3 times, 2 times, and finally once a week until complete discontinuation. Mood will be reassessed during the next visit.    5. Chronic left ankle pain.  She alternates between Tylenol and ibuprofen for left ankle pain and swelling, using Tylenol daily and ibuprofen 800 mg every other day. She also takes gabapentin for nerve damage in the same ankle. She requests a refill of ibuprofen. A refill for ibuprofen has been provided.    6. Hearing impairment.  She reports a sensation of clogging in one ear for several months, which she attributes to a congestive cold, but the sensation persists. A referral to audiology has been made for further evaluation of her hearing.    7. Skin tag.  She has not yet been contacted by dermatology regarding a skin tag on her back. She has been advised to contact dermatology to schedule an appointment for the removal of the skin tag on her back.    Follow-up  The patient is scheduled for a follow-up visit in 3 months.      Problem List Items Addressed This Visit       History of pelvic fracture    Osteopenia with  high risk of fracture    Relevant Medications    Estradiol 0.5 MG/0.5GM Gel    Other Relevant Orders    ESTRADIOL    PROGESTERONE    Post-menopausal    Relevant Medications    ibuprofen (MOTRIN) 800 MG Tab    Overweight (BMI 25.0-29.9)     Other Visit Diagnoses         Vasomotor symptoms due to menopause        Relevant Medications    Estradiol 0.5 MG/0.5GM Gel    Other Relevant Orders    ESTRADIOL    PROGESTERONE      Chronic pain of left ankle        Relevant Medications    ibuprofen (MOTRIN) 800 MG Tab      Hard of hearing        Relevant Orders    Referral to Audiology            Differential diagnosis, natural history, supportive care, and indications for immediate follow-up discussed.  Shared decision making approach was utilized, and patient is amendable with plan of care.  Patient understands to return to clinic or go to the emergency department if symptoms worsen. All questions and concerns addressed to the best of my knowledge.      Return in about 3 months (around 6/6/2025), or if symptoms worsen or fail to improve.    Please note that this dictation was created using voice recognition software. I have made every reasonable attempt to correct obvious errors, but I expect that there are errors of grammar and possibly content that I did not discover before finalizing the note.

## 2025-03-11 NOTE — Clinical Note
REFERRAL APPROVAL NOTICE         Sent on March 11, 2025                   Gabriela Carrillo  [address] [place] [address]                   Dear Ms. Carrillo,    After a careful review of the medical information and benefit coverage, [facility] has processed your referral. See below for additional details.    If applicable, you must be actively enrolled with your insurance for coverage of the authorized service. If you have any questions regarding your coverage, please contact your insurance directly.    REFERRAL INFORMATION   Referral #:  [id number]  Referred-To Department    Referred-By Provider:  Audiology    Dhaval Donovan D.N.P.   [facility]      [address]  [place] [address] [phone] [address] [place] [id number] [phone]    Referral Start Date:  03/06/2025  Referral End Date:   03/06/2026             SCHEDULING  If you do not already have an appointment, please call [phone] to make an appointment.     MORE INFORMATION  If you do not already have a [employer] account, sign up at: [email or web address]  You can access your medical information, make appointments, see lab results, billing information, and more.  If you have questions regarding this referral, please contact  the [facility] Referrals department at:             [phone]. Monday - Friday 8:00AM - 5:00PM.     Sincerely,    [facility]

## 2025-03-15 DIAGNOSIS — N95.1 VASOMOTOR SYMPTOMS DUE TO MENOPAUSE: ICD-10-CM

## 2025-03-15 DIAGNOSIS — N95.2 VAGINAL ATROPHY: ICD-10-CM

## 2025-03-17 RX ORDER — ESTRADIOL 0.1 MG/G
1 CREAM VAGINAL
Qty: 42.5 G | Refills: 2 | Status: SHIPPED | OUTPATIENT
Start: 2025-03-17

## 2025-03-17 NOTE — TELEPHONE ENCOUNTER
Received request via: Pharmacy    Was the patient seen in the last year in this department? Yes    Does the patient have an active prescription (recently filled or refills available) for medication(s) requested? No    Pharmacy Name: [facility] [address]    Does the patient have [facility] Plus and need 100-day supply? (This applies to ALL medications) Patient does not have SCP

## 2025-04-11 DIAGNOSIS — Z87.81 HISTORY OF PELVIC FRACTURE: ICD-10-CM

## 2025-04-11 DIAGNOSIS — M25.572 CHRONIC PAIN OF LEFT ANKLE: ICD-10-CM

## 2025-04-11 DIAGNOSIS — Z78.0 POST-MENOPAUSAL: ICD-10-CM

## 2025-04-11 DIAGNOSIS — G89.29 CHRONIC PAIN OF LEFT ANKLE: ICD-10-CM

## 2025-04-11 DIAGNOSIS — I10 PRIMARY HYPERTENSION: ICD-10-CM

## 2025-04-12 NOTE — TELEPHONE ENCOUNTER
Received request via: Pharmacy    Was the patient seen in the last year in this department? Yes    Does the patient have an active prescription (recently filled or refills available) for medication(s) requested? No    [facility]    Does the patient have shelter Plus and need 100-day supply? (This applies to ALL medications) Patient does not have SCP

## 2025-04-12 NOTE — TELEPHONE ENCOUNTER
Received request via: Pharmacy    Was the patient seen in the last year in this department? Yes    Does the patient have an active prescription (recently filled or refills available) for medication(s) requested? No    [facility]    Does the patient have retirement Plus and need 100-day supply? (This applies to ALL medications) Patient does not have SCP

## 2025-04-12 NOTE — TELEPHONE ENCOUNTER
Received request via: Pharmacy    Was the patient seen in the last year in this department? Yes    Does the patient have an active prescription (recently filled or refills available) for medication(s) requested? No    [facility]    Does the patient have long-term Plus and need 100-day supply? (This applies to ALL medications) Patient does not have SCP

## 2025-04-14 RX ORDER — OLMESARTAN MEDOXOMIL 20 MG/1
40 TABLET ORAL DAILY
Qty: 180 TABLET | Refills: 3 | Status: SHIPPED | OUTPATIENT
Start: 2025-04-14 | End: 2025-04-18 | Stop reason: SDUPTHER

## 2025-04-14 RX ORDER — ESCITALOPRAM OXALATE 10 MG/1
10 TABLET ORAL DAILY
Qty: 90 TABLET | Refills: 3 | Status: SHIPPED | OUTPATIENT
Start: 2025-04-14

## 2025-04-14 RX ORDER — IBUPROFEN 800 MG/1
800 TABLET, FILM COATED ORAL EVERY 8 HOURS PRN
Qty: 30 TABLET | Refills: 0 | Status: SHIPPED | OUTPATIENT
Start: 2025-04-14

## 2025-04-18 DIAGNOSIS — Z87.81 HISTORY OF PELVIC FRACTURE: ICD-10-CM

## 2025-04-18 DIAGNOSIS — I10 PRIMARY HYPERTENSION: ICD-10-CM

## 2025-04-18 NOTE — TELEPHONE ENCOUNTER
Received request via: Pharmacy    Was the patient seen in the last year in this department? Yes    Does the patient have an active prescription (recently filled or refills available) for medication(s) requested? No    [facility]    Does the patient have nursing home Plus and need 100-day supply? (This applies to ALL medications) Patient does not have SCP

## 2025-04-21 RX ORDER — OLMESARTAN MEDOXOMIL 20 MG/1
40 TABLET ORAL DAILY
Qty: 180 TABLET | Refills: 3 | Status: SHIPPED | OUTPATIENT
Start: 2025-04-21 | End: 2025-04-22

## 2025-04-22 ENCOUNTER — TELEPHONE (OUTPATIENT)
Dept: MEDICAL GROUP | Facility: PHYSICIAN GROUP | Age: 55
End: 2025-04-22
Payer: COMMERCIAL

## 2025-04-22 DIAGNOSIS — I10 PRIMARY HYPERTENSION: ICD-10-CM

## 2025-04-22 RX ORDER — OLMESARTAN MEDOXOMIL 40 MG/1
40 TABLET ORAL DAILY
Qty: 100 TABLET | Refills: 3 | Status: SHIPPED | OUTPATIENT
Start: 2025-04-22 | End: 2025-04-24 | Stop reason: SDUPTHER

## 2025-04-22 NOTE — TELEPHONE ENCOUNTER
Pt left . Her ins will only cover 1 bp pill a day, so she needs a new rx sent over with the sig as once a day, so that it will be covered by her ins.     [facility] [id number] - [place], [place] - [address]

## 2025-04-24 ENCOUNTER — PATIENT MESSAGE (OUTPATIENT)
Dept: MEDICAL GROUP | Facility: PHYSICIAN GROUP | Age: 55
End: 2025-04-24
Payer: COMMERCIAL

## 2025-04-24 DIAGNOSIS — I10 PRIMARY HYPERTENSION: ICD-10-CM

## 2025-04-24 RX ORDER — OLMESARTAN MEDOXOMIL 40 MG/1
40 TABLET ORAL DAILY
Qty: 90 TABLET | Refills: 3 | Status: SHIPPED | OUTPATIENT
Start: 2025-04-24

## 2025-04-24 NOTE — TELEPHONE ENCOUNTER
Received request via: Pharmacy    Was the patient seen in the last year in this department? Yes    Does the patient have an active prescription (recently filled or refills available) for medication(s) requested? No    Pharmacy Name: [facility] [address]    Does the patient have [facility] Plus and need 100-day supply? (This applies to ALL medications) Patient does not have SCP

## 2025-06-05 ENCOUNTER — APPOINTMENT (OUTPATIENT)
Dept: MEDICAL GROUP | Facility: PHYSICIAN GROUP | Age: 55
End: 2025-06-05
Payer: COMMERCIAL

## 2025-06-05 ENCOUNTER — HOSPITAL ENCOUNTER (OUTPATIENT)
Dept: LAB | Facility: MEDICAL CENTER | Age: 55
End: 2025-06-05
Payer: COMMERCIAL

## 2025-06-05 VITALS
HEIGHT: 60 IN | BODY MASS INDEX: 26.66 KG/M2 | HEART RATE: 52 BPM | TEMPERATURE: 97.8 F | DIASTOLIC BLOOD PRESSURE: 70 MMHG | WEIGHT: 135.8 LBS | SYSTOLIC BLOOD PRESSURE: 100 MMHG | OXYGEN SATURATION: 98 %

## 2025-06-05 DIAGNOSIS — M85.80 OSTEOPENIA WITH HIGH RISK OF FRACTURE: ICD-10-CM

## 2025-06-05 DIAGNOSIS — G43.109 MIGRAINE WITH AURA AND WITHOUT STATUS MIGRAINOSUS, NOT INTRACTABLE: ICD-10-CM

## 2025-06-05 DIAGNOSIS — N95.1 VASOMOTOR SYMPTOMS DUE TO MENOPAUSE: ICD-10-CM

## 2025-06-05 DIAGNOSIS — N95.2 VAGINAL ATROPHY: ICD-10-CM

## 2025-06-05 DIAGNOSIS — H91.90 HARD OF HEARING: ICD-10-CM

## 2025-06-05 DIAGNOSIS — I10 PRIMARY HYPERTENSION: Primary | ICD-10-CM

## 2025-06-05 DIAGNOSIS — D22.9 ATYPICAL MOLE: ICD-10-CM

## 2025-06-05 LAB
ESTRADIOL SERPL-MCNC: 69 PG/ML
PROGEST SERPL-MCNC: 0.99 NG/ML

## 2025-06-05 PROCEDURE — 99214 OFFICE O/P EST MOD 30 MIN: CPT

## 2025-06-05 PROCEDURE — 3074F SYST BP LT 130 MM HG: CPT

## 2025-06-05 PROCEDURE — 36415 COLL VENOUS BLD VENIPUNCTURE: CPT

## 2025-06-05 PROCEDURE — 3078F DIAST BP <80 MM HG: CPT

## 2025-06-05 PROCEDURE — 82670 ASSAY OF TOTAL ESTRADIOL: CPT

## 2025-06-05 PROCEDURE — 84144 ASSAY OF PROGESTERONE: CPT

## 2025-06-05 RX ORDER — OLMESARTAN MEDOXOMIL 20 MG/1
10 TABLET ORAL DAILY
Qty: 45 TABLET | Refills: 3 | Status: SHIPPED | OUTPATIENT
Start: 2025-06-05

## 2025-06-05 RX ORDER — PROGESTERONE 100 MG/1
100 CAPSULE ORAL NIGHTLY
Qty: 90 CAPSULE | Refills: 2 | Status: SHIPPED | OUTPATIENT
Start: 2025-06-05 | End: 2025-06-09

## 2025-06-05 RX ORDER — SUMATRIPTAN SUCCINATE 25 MG/1
25 TABLET ORAL
Qty: 10 TABLET | Refills: 3 | Status: SHIPPED | OUTPATIENT
Start: 2025-06-05

## 2025-06-05 ASSESSMENT — ENCOUNTER SYMPTOMS
VOMITING: 0
MYALGIAS: 0
SHORTNESS OF BREATH: 0
DIARRHEA: 0
BLURRED VISION: 0
COUGH: 0
WEIGHT LOSS: 0
DIZZINESS: 0
WEAKNESS: 0
NAUSEA: 0
FEVER: 0
CHILLS: 0
CONSTIPATION: 0
HEADACHES: 0
ABDOMINAL PAIN: 0

## 2025-06-05 ASSESSMENT — FIBROSIS 4 INDEX: FIB4 SCORE: 1.15

## 2025-06-05 NOTE — PROGRESS NOTES
Verbal consent was acquired by the patient to use [employer] ambient listening note generation during this visit  Subjective:     CC:  The primary encounter diagnosis was Primary hypertension. Diagnoses of Hard of hearing, Migraine with aura and without status migrainosus, not intractable, Atypical mole, Vasomotor symptoms due to menopause, Vaginal atrophy, and Osteopenia with high risk of fracture were also pertinent to this visit.    HISTORY OF THE PRESENT ILLNESS: Patient is a 55 y.o. female.   Problem   Hard of Hearing   Migraine With Aura and Without Status Migrainosus, Not Intractable       History of Present Illness  The patient presents for evaluation of hypertension, hearing loss, ocular migraines, and a nevus.    Hypertension  - The patient has been attempting to reduce her olmesartan dosage but experienced an increase in blood pressure upon doing so.  - Despite weight loss and maintaining an active lifestyle, her blood pressure remains elevated.  - Currently, she is taking half of a 20 mg tablet of olmesartan, as her insurance does not cover the 10 mg dosage.  - She has a family history of hypertension.    Hearing Loss  - She reports difficulty in hearing, which she attributes to potential cerumen impaction.  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  - An upcoming appointment on 06/31/2025 with an otolaryngologist has been scheduled to address this issue.    Ocular Migraines  - She experiences ocular migraines, characterized by transient monocular visual loss in her left eye, followed by a migraine.  - These episodes occur approximately 5 to 6 times per month and can last up to 30 minutes, during which she is unable to drive.  - Additionally, she experiences regular migraines, which can be severe enough to incapacitate her for the day.  - She has not sought prescription medication for these symptoms but manages them with  over-the-counter remedies such as caffeine, Excedrin, acetaminophen, or ibuprofen.  - Her headaches typically last around 45 minutes and are often alleviated by sleep.    Nevus  - She has a nevus that was previously recommended for evaluation during a screening.  - However, she did not follow up due to lack of insurance at the time.  - The nevus has been excised in the past but has since recurred.    Supplemental information: She has been responding well to her estrogen therapy, which she believes has contributed to her weight loss. She reports feeling more focused and overall improved. Currently, she is using an estrogen gel and applying it to her leg. She is nearing the end of her Prometrium supply, with only 6 or 7 tablets remaining.    PAST SURGICAL HISTORY:  - Nevus excision  - Pelvic bone fracture  - Ankle band surgery    FAMILY HISTORY  Her parents both had high blood pressure.    ROS:   Review of Systems   Constitutional:  Negative for chills, fever, malaise/fatigue and weight loss.   Eyes:  Negative for blurred vision.   Respiratory:  Negative for cough and shortness of breath.    Cardiovascular:  Negative for chest pain.   Gastrointestinal:  Negative for abdominal pain, constipation, diarrhea, nausea and vomiting.   Musculoskeletal:  Negative for myalgias.   Neurological:  Negative for dizziness, weakness and headaches.         Objective:     Exam: /70 (BP Location: Left arm, Patient Position: Sitting, BP Cuff Size: Adult)   Pulse (!) 52   Temp 36.6 °C (97.8 °F) (Temporal)   Ht 1.524 m (5')   Wt 61.6 kg (135 lb 12.8 oz)   SpO2 98%  Body mass index is 26.52 kg/m².    Physical Exam  Constitutional:       General: She is not in acute distress.     Appearance: Normal appearance. She is not ill-appearing or toxic-appearing.   HENT:      Head: Normocephalic.   Eyes:      Conjunctiva/sclera: Conjunctivae normal.   Pulmonary:      Effort: Pulmonary effort is normal.   Skin:     General: Skin is warm and  dry.   Neurological:      General: No focal deficit present.      Mental Status: She is alert and oriented to person, place, and time.   Psychiatric:         Mood and Affect: Mood normal.         Behavior: Behavior normal.           Labs:   No visits with results within 1 Month(s) from this visit.   Latest known visit with results is:   Hospital Outpatient Visit on 11/27/2024   Component Date Value    Progesterone 11/27/2024 0.26     Estradiol-E2 11/27/2024 86.6     Glycohemoglobin 11/27/2024 4.6     Est Avg Glucose 11/27/2024 85     Cholesterol,Tot 11/27/2024 174     Triglycerides 11/27/2024 56     HDL 11/27/2024 54     LDL 11/27/2024 109 (H)     Sodium 11/27/2024 139     Potassium 11/27/2024 4.5     Chloride 11/27/2024 105     Co2 11/27/2024 22     Anion Gap 11/27/2024 12.0     Glucose 11/27/2024 81     Bun 11/27/2024 27 (H)     Creatinine 11/27/2024 1.03     Calcium 11/27/2024 9.2     Correct Calcium 11/27/2024 9.0     AST(SGOT) 11/27/2024 16     ALT(SGPT) 11/27/2024 9     Alkaline Phosphatase 11/27/2024 63     Total Bilirubin 11/27/2024 0.3     Albumin 11/27/2024 4.2     Total Protein 11/27/2024 7.0     Globulin 11/27/2024 2.8     A-G Ratio 11/27/2024 1.5     WBC 11/27/2024 5.7     RBC 11/27/2024 3.92 (L)     Hemoglobin 11/27/2024 12.5     Hematocrit 11/27/2024 37.1     MCV 11/27/2024 94.6     MCH 11/27/2024 31.9     MCHC 11/27/2024 33.7     RDW 11/27/2024 44.7     Platelet Count 11/27/2024 264     MPV 11/27/2024 9.1     Neutrophils-Polys 11/27/2024 45.30     Lymphocytes 11/27/2024 40.60     Monocytes 11/27/2024 9.10     Eosinophils 11/27/2024 3.70     Basophils 11/27/2024 1.10     Immature Granulocytes 11/27/2024 0.20     Nucleated RBC 11/27/2024 0.00     Neutrophils (Absolute) 11/27/2024 2.59     Lymphs (Absolute) 11/27/2024 2.32     Monos (Absolute) 11/27/2024 0.52     Eos (Absolute) 11/27/2024 0.21     Baso (Absolute) 11/27/2024 0.06     Immature Granulocytes (a* 11/27/2024 0.01     NRBC (Absolute)  11/27/2024 0.00     Fasting Status 11/27/2024 Fasting     GFR (CKD-EPI) 11/27/2024 63        Assessment & Plan: Medical Decision Making   55 y.o. female with the following -    Assessment & Plan  1. Hypertension.  - Blood pressure increased after weaning off olmesartan.  - Currently taking olmesartan 10 mg daily by halving 20 mg tablets.  - Discussion about continuing with 20 mg tablets, halved to 10 mg daily.  - Prescription for olmesartan 20 mg will be sent to pharmacy.    2. Hearing loss.  - Patient reports difficulty hearing and has an upcoming appointment on 07/31/2025.  - Examination of ears to check for cerumen impaction.  - Discussion about potential referral to ear, nose, and throat specialist if necessary.    3. Ocular migraines.  - Patient experiences ocular migraines with vision changes and subsequent headaches.  - Sumatriptan 25 mg prescribed, to be taken at onset of migraine, with an option to take an additional tablet if no relief after 20 minutes.  - Recommendations for consistent sleep, avoiding triggers, adequate hydration, regular exercise, and annual eye exams.  - Suggested over-the-counter supplements: riboflavin 400 mg daily, CoQ10 100 mg twice daily, magnesium glycinate, and melatonin.    4. Hormone replacement therapy.  - Patient reports positive response to current hormone therapy regimen.  - Prometrium 100 mg will be refilled.  - Progesterone level will be assessed; if <2, dosage will be increased to 200 mg.    Migraine:    -Take medications as directed at onset of headache so that it will not become disabling  -Keep a headache diary to monitor triggers and treatment effectiveness  -Do not use OTC analgesics on a daily basis to prevent rebound headache-overuse of medication for even a few months can make headache worse or chronic  -It is important to manage stress as this can greatly exacerbate your headaches  -Ensure you have a consistent sleep schedule, getting at least 8 hours per  night  -Ensure adequate nutrition with routine meal schedule and hydration (64 oz of water per day), daily exercise  -Annual eye exam with optometrist  -Here are the supplements I recommend for migraine prophylaxis:  - Riboflavin 400 mg daily  - CoQ-10 100 mg twice daily  - Magnesium citrate 600 mg daily (hold for diarrhea/loose stools)  -Melatonin 3 mg at bedtime    Problem List Items Addressed This Visit          Family Medicine Problems    Primary hypertension - Primary    Relevant Medications    olmesartan (BENICAR) 20 MG Tab       Other    Osteopenia with high risk of fracture    Relevant Medications    progesterone (PROMETRIUM) 100 MG Cap    Hard of hearing    Migraine with aura and without status migrainosus, not intractable    Relevant Medications    olmesartan (BENICAR) 20 MG Tab    SUMAtriptan (IMITREX) 25 MG Tab tablet     Other Visit Diagnoses         Atypical mole        Relevant Orders    Referral to Dermatology      Vasomotor symptoms due to menopause        Relevant Medications    progesterone (PROMETRIUM) 100 MG Cap      Vaginal atrophy        Relevant Medications    progesterone (PROMETRIUM) 100 MG Cap            Differential diagnosis, natural history, supportive care, and indications for immediate follow-up discussed.  Shared decision making approach was utilized, and patient is amendable with plan of care.  Patient understands to return to clinic or go to the emergency department if symptoms worsen. All questions and concerns addressed to the best of my knowledge.      Return in about 6 months (around 12/5/2025), or if symptoms worsen or fail to improve.    Please note that this dictation was created using voice recognition software. I have made every reasonable attempt to correct obvious errors, but I expect that there are errors of grammar and possibly content that I did not discover before finalizing the note.

## 2025-06-09 ENCOUNTER — RESULTS FOLLOW-UP (OUTPATIENT)
Dept: MEDICAL GROUP | Facility: PHYSICIAN GROUP | Age: 55
End: 2025-06-09

## 2025-06-09 DIAGNOSIS — M25.572 CHRONIC PAIN OF LEFT ANKLE: ICD-10-CM

## 2025-06-09 DIAGNOSIS — Z78.0 POST-MENOPAUSAL: ICD-10-CM

## 2025-06-09 DIAGNOSIS — M85.80 OSTEOPENIA WITH HIGH RISK OF FRACTURE: ICD-10-CM

## 2025-06-09 DIAGNOSIS — N95.1 VASOMOTOR SYMPTOMS DUE TO MENOPAUSE: ICD-10-CM

## 2025-06-09 DIAGNOSIS — G89.29 CHRONIC PAIN OF LEFT ANKLE: ICD-10-CM

## 2025-06-09 DIAGNOSIS — Z78.0 POST-MENOPAUSAL: Primary | ICD-10-CM

## 2025-06-09 RX ORDER — PROGESTERONE 200 MG/1
200 CAPSULE ORAL DAILY
Qty: 90 CAPSULE | Refills: 3 | Status: SHIPPED | OUTPATIENT
Start: 2025-06-09

## 2025-06-10 RX ORDER — ESTRADIOL 0.5 MG/.5G
0.5 GEL TOPICAL DAILY
Qty: 90 EACH | Refills: 3 | Status: SHIPPED | OUTPATIENT
Start: 2025-06-10

## 2025-06-10 RX ORDER — IBUPROFEN 800 MG/1
800 TABLET, FILM COATED ORAL EVERY 8 HOURS PRN
Qty: 30 TABLET | Refills: 3 | Status: SHIPPED | OUTPATIENT
Start: 2025-06-10

## 2025-06-10 NOTE — TELEPHONE ENCOUNTER
Received request via: Patient    Was the patient seen in the last year in this department? Yes    Does the patient have an active prescription (recently filled or refills available) for medication(s) requested? No    Pharmacy Name:     Does the patient have [facility] Plus and need 100-day supply? (This applies to ALL medications) Patient does not have SCP

## 2025-06-10 NOTE — TELEPHONE ENCOUNTER
Received request via: Patient    Was the patient seen in the last year in this department? Yes    Does the patient have an active prescription (recently filled or refills available) for medication(s) requested? No    Pharmacy Name:     Does the patient have [facility] and need 100-day supply? (This applies to ALL medications) Patient does not have SCP

## 2025-06-16 NOTE — Clinical Note
REFERRAL APPROVAL NOTICE         Sent on June 16, 2025                   Gabriela Carrillo  [address]  [place] [address]                   Dear Ms. Carrillo,    After a careful review of the medical information and benefit coverage, [facility] has processed your referral. See below for additional details.    If applicable, you must be actively enrolled with your insurance for coverage of the authorized service. If you have any questions regarding your coverage, please contact your insurance directly.    REFERRAL INFORMATION   Referral #:  [id number]  Referred-To Department    Referred-By Provider:  Dermatology    Dhaval Donovan D.N.P.   [facility]      [address]  [place] [address]  [phone] [address]  [place] [id number] [phone]    Referral Start Date:  06/05/2025  Referral End Date:   06/05/2026             SCHEDULING  If you do not already have an appointment, please call [phone] to make an appointment.     MORE INFORMATION  If you do not already have a [employer] account, sign up at: [email or web address]  You can access your medical information, make appointments, see lab results, billing information, and more.  If you have questions regarding this referral, please contact  the [facility] Referrals department at:             [phone]. Monday - Friday 8:00AM - 5:00PM.     Sincerely,    [facility]

## 2025-06-22 ENCOUNTER — OFFICE VISIT (OUTPATIENT)
Dept: URGENT CARE | Facility: PHYSICIAN GROUP | Age: 55
End: 2025-06-22
Payer: COMMERCIAL

## 2025-06-22 VITALS
DIASTOLIC BLOOD PRESSURE: 77 MMHG | WEIGHT: 134 LBS | HEART RATE: 77 BPM | SYSTOLIC BLOOD PRESSURE: 128 MMHG | BODY MASS INDEX: 26.31 KG/M2 | OXYGEN SATURATION: 98 % | RESPIRATION RATE: 16 BRPM | TEMPERATURE: 98 F | HEIGHT: 60 IN

## 2025-06-22 DIAGNOSIS — T36.95XA ANTIBIOTIC-INDUCED YEAST INFECTION: ICD-10-CM

## 2025-06-22 DIAGNOSIS — B37.9 ANTIBIOTIC-INDUCED YEAST INFECTION: ICD-10-CM

## 2025-06-22 DIAGNOSIS — A46 ERYSIPELAS: Primary | ICD-10-CM

## 2025-06-22 PROCEDURE — 3078F DIAST BP <80 MM HG: CPT | Performed by: STUDENT IN AN ORGANIZED HEALTH CARE EDUCATION/TRAINING PROGRAM

## 2025-06-22 PROCEDURE — 3074F SYST BP LT 130 MM HG: CPT | Performed by: STUDENT IN AN ORGANIZED HEALTH CARE EDUCATION/TRAINING PROGRAM

## 2025-06-22 PROCEDURE — 99214 OFFICE O/P EST MOD 30 MIN: CPT | Performed by: STUDENT IN AN ORGANIZED HEALTH CARE EDUCATION/TRAINING PROGRAM

## 2025-06-22 RX ORDER — FLUCONAZOLE 150 MG/1
150 TABLET ORAL DAILY
Qty: 2 TABLET | Refills: 0 | Status: SHIPPED | OUTPATIENT
Start: 2025-06-22

## 2025-06-22 RX ORDER — CEPHALEXIN 500 MG/1
500 CAPSULE ORAL 4 TIMES DAILY
Qty: 20 CAPSULE | Refills: 0 | Status: SHIPPED | OUTPATIENT
Start: 2025-06-22 | End: 2025-06-27

## 2025-06-22 ASSESSMENT — ENCOUNTER SYMPTOMS
CHILLS: 0
FEVER: 0

## 2025-06-22 ASSESSMENT — FIBROSIS 4 INDEX: FIB4 SCORE: 1.15

## 2025-06-22 NOTE — PROGRESS NOTES
Subjective:   Gabriela Carrillo is a 55 y.o. female who presents for Rash (Rash on both Legs . )      HPI:  55-year-old female who presents with significant rash on bilateral legs on her right thigh and the right side of her abdomen.  She reports that she had a very bad sunburn around 1 week ago and it landed up being blistered and red.  Most of the skin has peeled off and the blisters have resolved but there is still significant redness and tenderness to bilateral shins some small areas of some purulence.  Pretty diffuse rash on bilateral shins side of hip and right side abdomen where she missed with sunscreen.    Review of Systems   Constitutional:  Negative for chills and fever.   Skin:  Positive for rash.       Medications:    acetaminophen Tabs  amoxicillin  CALCIUM 1200 PO  cephALEXin Caps  escitalopram Tabs  estradiol  Estradiol Gel  gabapentin Caps  ibuprofen Tabs  olmesartan Tabs  progesterone  SUMAtriptan Tabs  VITAMIN B 12 PO  Vitamin C Tabs    Allergies: Patient has no known allergies.    Problem List: Gabriela Carrillo does not have any pertinent problems on file.    Surgical History:  Past Surgical History:   Procedure Laterality Date    PB REPAIR COLLAT ANKLE LIGMNT,SECONDARY  1/3/2024    Procedure: LEFT LATERAL LIGAMENT REPAIR;  Surgeon: Keshawn Cardoso M.D.;  Location: [facility] [place];  Service: Orthopedics    PB OPEN TX DISTAL TIBIOFIBULAR JOINT DISRUPTION Left 1/3/2024    Procedure: LEFT SYNDESMOSIS OPEN REDUCTION INTERNAL FIXATION;  Surgeon: Keshawn Cardoso M.D.;  Location: [facility] [place];  Service: Orthopedics    PB RELEASE TIB/FIB/ANKLE FLEX TENDON,EA Left 1/3/2024    Procedure: LEFT PERONEAL TENDON DEBRIDEMENT, REPAIRS AS INDICATED;  Surgeon: Keshawn Cardoso M.D.;  Location: [facility] [place];  Service: Orthopedics    ANKLE ARTHROSCOPY Left 1/3/2024    Procedure: LEFT ANKLE ARTHROSCOPY;  Surgeon: Keshawn Cardoso  M.D.;  Location: [facility];  Service: Orthopedics    PB OPEN INTERN FIX ANTER PELV RING FX Right 2/7/2023    Procedure: RIGHT PERCUTANEOUS FIXATION ANTERIOR PELVIC RING;  Surgeon: Geovanni Marcial M.D.;  Location: [facility];  Service: Orthopedics    GYN LAPAROSCOPY  2020       Past Social Hx: Gabriela Carrillo  reports that she has never smoked. She has never used smokeless tobacco. She reports current alcohol use of about 1.2 oz of alcohol per week. She reports that she does not use drugs.     Past Family Hx:  Gabriela Carrillo family history includes Cancer in her father and mother; Heart Disease in her brother, father, and mother; Hypertension in her father and mother; Lung Disease in her father and mother; Neuromuscular disorder in her brother; Psychiatric Illness in her brother.     Problem list, medications, and allergies reviewed by myself today in Epic.     Objective:     /77 (BP Location: Left arm, Patient Position: Sitting, BP Cuff Size: Adult)   Pulse 77   Temp 36.7 °C (98 °F) (Temporal)   Resp 16   Ht 1.524 m (5')   Wt 60.8 kg (134 lb)   SpO2 98%   BMI 26.17 kg/m²     Physical Exam  Constitutional:       Appearance: Normal appearance.   Cardiovascular:      Rate and Rhythm: Normal rate and regular rhythm.      Pulses: Normal pulses.      Heart sounds: Normal heart sounds.   Pulmonary:      Effort: Pulmonary effort is normal.      Breath sounds: Normal breath sounds.   Skin:     Comments: Bilateral lower extremities on the shins with significant redness slightly raised blotchy lesions with surrounding erythema a few areas of some punctate purulence present.  Additional area on thigh and on right side of abdomen and hip.   Neurological:      Mental Status: She is alert.         Assessment/Plan:     Diagnosis and associated orders:     1. Erysipelas  cephALEXin (KEFLEX) 500 MG Cap      2. Antibiotic-induced yeast infection  fluconazole (DIFLUCAN) 150  MG tablet         Comments/MDM:     1. Erysipelas (Primary)  Per patient reports she had a significant sunburn on this area.  Some burn has mostly peeled off and this looks like a superficial infection from the damaged skin possibly erysipelas versus cellulitis.  - Will treat with cephalexin 500 mg 4 times daily for the next 5 days.  - Recommend patient finish his course antibiotics any worsening of his symptoms or redness spreading to areas that were not affected by the sunburn any development of fever chills or worsening signs of infection recommend reevaluation immediately.  - cephALEXin (KEFLEX) 500 MG Cap; Take 1 Capsule by mouth 4 times a day for 5 days.  Dispense: 20 Capsule; Refill: 0    2. Antibiotic-induced yeast infection  Patient reports a history of antibiotic induced yeast infection.  Will send in Diflucan as below in case she develops this.  - fluconazole (DIFLUCAN) 150 MG tablet; Take 1 Tablet by mouth every day. Take 1 tablet on day one. May repeat after 72 hours if no improvement.  Dispense: 2 Tablet; Refill: 0           Differential diagnosis, natural history, supportive care, and indications for immediate follow-up discussed.    Advised the patient to follow-up with the primary care physician for recheck, reevaluation, and consideration of further management.    Please note that this dictation was created using voice recognition software. I have made a reasonable attempt to correct obvious errors, but I expect that there are errors of grammar and possibly content that I did not discover before finalizing the note.    Joe Hopper M.D.

## 2025-06-22 NOTE — LETTER
June 22, 2025    To Whom It May Concern:         This is confirmation that Gabriela Carrillo attended her scheduled appointment with Joe Hopper M.D. on 6/22/25. May return on 6/24 if feeling improved. Please excuse from time missed.          If you have any questions please do not hesitate to call me at the phone number listed below.    Sincerely,          Joe Hopper M.D.  [phone]

## 2025-07-31 ENCOUNTER — APPOINTMENT (OUTPATIENT)
Dept: AUDIOLOGY | Facility: OTHER | Age: 55
End: 2025-07-31
Payer: COMMERCIAL

## 2025-08-20 ENCOUNTER — TELEPHONE (OUTPATIENT)
Dept: HEALTH INFORMATION MANAGEMENT | Facility: OTHER | Age: 55
End: 2025-08-20
Payer: COMMERCIAL

## (undated) DEVICE — DRAIN PENROSE 1 IN X 18 IN - STERILE (25EA/BX)

## (undated) DEVICE — TOWELS CLOTH SURGICAL - (4/PK 20PK/CA)

## (undated) DEVICE — SLEEVE, VASO, THIGH, MED

## (undated) DEVICE — CHLORAPREP 26 ML APPLICATOR - ORANGE TINT(25/CA)

## (undated) DEVICE — GLOVE BIOGEL SZ 8 SURGICAL PF LTX - (50PR/BX 4BX/CA)

## (undated) DEVICE — SUCTION INSTRUMENT YANKAUER OPEN TIP W/O VENT (50EA/CA)

## (undated) DEVICE — DRAPE IOBAN II INCISE 23X17 - (10EA/BX 4BX/CA)

## (undated) DEVICE — CANISTER SUCTION 3000ML MECHANICAL FILTER AUTO SHUTOFF MEDI-VAC NONSTERILE LF DISP  (40EA/CA)

## (undated) DEVICE — DRESSING PETROLEUM GAUZE 5 X 9" (50EA/BX 4BX/CA)"

## (undated) DEVICE — SUTURE 0 VICRYL PLUS CT-1 - 8 X 18 INCH (12/BX)

## (undated) DEVICE — GOWN WARMING STANDARD FLEX - (30/CA)

## (undated) DEVICE — DRAPE LARGE 3 QUARTER - (20/CA)

## (undated) DEVICE — DRAPE STRLE REG TOWEL 18X24 - (10/BX 4BX/CA)"

## (undated) DEVICE — ASNIS III CANNULATED 6.5MM TWIST DRILL

## (undated) DEVICE — BLADE ELECTRODE COATED EDGE (50EA/PK)

## (undated) DEVICE — DERMABOND ADVANCED - (12EA/BX)

## (undated) DEVICE — SET LEADWIRE 5 LEAD BEDSIDE DISPOSABLE ECG (1SET OF 5/EA)

## (undated) DEVICE — DRAPE C ARMOR (12EA/CA)

## (undated) DEVICE — SUTURE 2-0 VICRYL PLUS CT-1 - 8 X 18 INCH(12/BX)

## (undated) DEVICE — COVER LIGHT HANDLE ALC PLUS DISP (18EA/BX)

## (undated) DEVICE — DRAPE U SPLIT IMP 54 X 76 - (24/CA)

## (undated) DEVICE — DRESSING TRANSPARENT FILM TEGADERM 4 X 4.75" (50EA/BX)"

## (undated) DEVICE — BOVIE BLADE COATED &INSULATED - 25/PK

## (undated) DEVICE — BONE WAX (12PK/BX)

## (undated) DEVICE — SUTURE GENERAL

## (undated) DEVICE — ELECTRODE DUAL RETURN W/ CORD - (50/PK)

## (undated) DEVICE — PACK MAJOR ORTHO - (2EA/CA)

## (undated) DEVICE — Device

## (undated) DEVICE — DRAPE SURGICAL U 77X120 - (10/CA)

## (undated) DEVICE — DRESSING ABDOMINAL PAD STERILE 8 X 10" (360EA/CA)"

## (undated) DEVICE — PENCIL ELECTSURG 10FT BTN SWH - (50/CA)

## (undated) DEVICE — DRAPE U ORTHOPEDIC - (10/BX)

## (undated) DEVICE — DRAPE 36X28IN RAD CARM BND BG - (25/CA) O

## (undated) DEVICE — SUCTION INSTRUMENT YANKAUER BULBOUS TIP W/O VENT (50EA/CA)

## (undated) DEVICE — GLOVE BIOGEL INDICATOR SZ 8 SURGICAL PF LTX - (50/BX 4BX/CA)

## (undated) DEVICE — TOWEL STOP TIMEOUT SAFETY FLAG (40EA/CA)

## (undated) DEVICE — SODIUM CHL IRRIGATION 0.9% 1000ML (12EA/CA)

## (undated) DEVICE — SUTURE 4-0 MONOCRYL PLUS PS-1 - 27 INCH (36/BX)

## (undated) DEVICE — TUBING CLEARLINK DUO-VENT - C-FLO (48EA/CA)

## (undated) DEVICE — LACTATED RINGERS INJ 1000 ML - (14EA/CA 60CA/PF)

## (undated) DEVICE — GLOVE BIOGEL PI INDICATOR SZ 8.0 SURGICAL PF LF -(50/BX 4BX/CA)

## (undated) DEVICE — GLOVE SZ 7.5 BIOGEL PI MICRO - PF LF (50PR/BX)

## (undated) DEVICE — DRAPE IOBAN II 23 IN X 33 IN - (10/BX)

## (undated) DEVICE — GOWN SURGEONS X-LARGE - DISP. (30/CA)

## (undated) DEVICE — SET EXTENSION WITH 2 PORTS (48EA/CA) ***PART #2C8610 IS A SUBSTITUTE*****

## (undated) DEVICE — SENSOR OXIMETER ADULT SPO2 RD SET (20EA/BX)